# Patient Record
Sex: FEMALE | Race: WHITE | Employment: OTHER | URBAN - NONMETROPOLITAN AREA
[De-identification: names, ages, dates, MRNs, and addresses within clinical notes are randomized per-mention and may not be internally consistent; named-entity substitution may affect disease eponyms.]

---

## 2017-06-15 ENCOUNTER — HOSPITAL ENCOUNTER (OUTPATIENT)
Dept: WOMENS IMAGING | Age: 72
Discharge: HOME | End: 2017-06-15
Payer: MEDICARE

## 2017-06-15 DIAGNOSIS — Z12.31 VISIT FOR SCREENING MAMMOGRAM: ICD-10-CM

## 2017-06-15 PROCEDURE — G0202 SCR MAMMO BI INCL CAD: HCPCS

## 2018-03-23 ENCOUNTER — HOSPITAL ENCOUNTER (OUTPATIENT)
Age: 73
Discharge: HOME | End: 2018-03-23
Payer: MEDICARE

## 2018-03-23 LAB
-: ABNORMAL
AMORPHOUS: ABNORMAL
BACTERIA: ABNORMAL
BILIRUBIN URINE: ABNORMAL
CASTS UA: ABNORMAL /LPF
COLOR: YELLOW
COMMENT UA: ABNORMAL
CRYSTALS, UA: ABNORMAL /HPF
EPITHELIAL CELLS UA: ABNORMAL /HPF (ref 0–25)
GLUCOSE URINE: NEGATIVE
KETONES, URINE: NEGATIVE
LEUKOCYTE ESTERASE, URINE: NEGATIVE
MUCUS: ABNORMAL
NITRITE, URINE: NEGATIVE
OTHER OBSERVATIONS UA: ABNORMAL
PH UA: 5 (ref 5–9)
PROTEIN UA: NEGATIVE
RBC UA: ABNORMAL /HPF (ref 0–2)
RENAL EPITHELIAL, UA: ABNORMAL /HPF
SPECIFIC GRAVITY UA: >1.03 (ref 1.01–1.02)
TRICHOMONAS: ABNORMAL
TURBIDITY: CLEAR
URINE HGB: NEGATIVE
UROBILINOGEN, URINE: NORMAL
WBC UA: ABNORMAL /HPF (ref 0–5)
YEAST: ABNORMAL

## 2018-03-23 PROCEDURE — 81001 URINALYSIS AUTO W/SCOPE: CPT

## 2018-03-23 PROCEDURE — 87086 URINE CULTURE/COLONY COUNT: CPT

## 2018-03-24 LAB
CULTURE: NORMAL
CULTURE: NORMAL
Lab: NORMAL
Lab: NORMAL
SPECIMEN DESCRIPTION: NORMAL
SPECIMEN DESCRIPTION: NORMAL
STATUS: NORMAL

## 2019-11-21 PROBLEM — R73.01 IMPAIRED FASTING GLUCOSE: Status: ACTIVE | Noted: 2019-11-21

## 2019-11-21 PROBLEM — R73.01 IMPAIRED FASTING GLUCOSE: Chronic | Status: ACTIVE | Noted: 2019-11-21

## 2022-09-29 ENCOUNTER — HOSPITAL ENCOUNTER (EMERGENCY)
Age: 77
Discharge: HOME | End: 2022-09-30
Attending: STUDENT IN AN ORGANIZED HEALTH CARE EDUCATION/TRAINING PROGRAM
Payer: MEDICARE

## 2022-09-29 ENCOUNTER — APPOINTMENT (OUTPATIENT)
Dept: CT IMAGING | Age: 77
End: 2022-09-29
Payer: MEDICARE

## 2022-09-29 VITALS
HEART RATE: 70 BPM | SYSTOLIC BLOOD PRESSURE: 198 MMHG | BODY MASS INDEX: 29.88 KG/M2 | HEIGHT: 64 IN | RESPIRATION RATE: 20 BRPM | OXYGEN SATURATION: 99 % | DIASTOLIC BLOOD PRESSURE: 76 MMHG | WEIGHT: 175 LBS

## 2022-09-29 DIAGNOSIS — R93.5 ABNORMAL COMPUTED TOMOGRAPHY OF ABDOMEN AND PELVIS: Primary | ICD-10-CM

## 2022-09-29 DIAGNOSIS — R31.9 HEMATURIA, UNSPECIFIED TYPE: ICD-10-CM

## 2022-09-29 LAB
ABSOLUTE EOS #: 0.14 K/UL (ref 0–0.44)
ABSOLUTE IMMATURE GRANULOCYTE: 0.03 K/UL (ref 0–0.3)
ABSOLUTE LYMPH #: 2.18 K/UL (ref 1.1–3.7)
ABSOLUTE MONO #: 1 K/UL (ref 0.1–1.2)
ANION GAP SERPL CALCULATED.3IONS-SCNC: 13 MMOL/L (ref 9–17)
BACTERIA: ABNORMAL
BASOPHILS # BLD: 1 % (ref 0–2)
BASOPHILS ABSOLUTE: 0.06 K/UL (ref 0–0.2)
BILIRUBIN URINE: NEGATIVE
BUN BLDV-MCNC: 19 MG/DL (ref 8–23)
BUN/CREAT BLD: 23 (ref 9–20)
CALCIUM SERPL-MCNC: 10 MG/DL (ref 8.6–10.4)
CHLORIDE BLD-SCNC: 102 MMOL/L (ref 98–107)
CO2: 23 MMOL/L (ref 20–31)
COLOR: YELLOW
CREAT SERPL-MCNC: 0.82 MG/DL (ref 0.5–0.9)
EOSINOPHILS RELATIVE PERCENT: 1 % (ref 1–4)
EPITHELIAL CELLS UA: ABNORMAL /HPF (ref 0–25)
GFR AFRICAN AMERICAN: >60 ML/MIN
GFR NON-AFRICAN AMERICAN: >60 ML/MIN
GFR SERPL CREATININE-BSD FRML MDRD: ABNORMAL ML/MIN/{1.73_M2}
GFR SERPL CREATININE-BSD FRML MDRD: ABNORMAL ML/MIN/{1.73_M2}
GLUCOSE BLD-MCNC: 108 MG/DL (ref 70–99)
GLUCOSE URINE: NEGATIVE
HCT VFR BLD CALC: 43.6 % (ref 36.3–47.1)
HEMOGLOBIN: 15.1 G/DL (ref 11.9–15.1)
IMMATURE GRANULOCYTES: 0 %
KETONES, URINE: NEGATIVE
LEUKOCYTE ESTERASE, URINE: NEGATIVE
LYMPHOCYTES # BLD: 20 % (ref 24–43)
MAGNESIUM: 2 MG/DL (ref 1.6–2.6)
MCH RBC QN AUTO: 31.3 PG (ref 25.2–33.5)
MCHC RBC AUTO-ENTMCNC: 34.6 G/DL (ref 28.4–34.8)
MCV RBC AUTO: 90.3 FL (ref 82.6–102.9)
MONOCYTES # BLD: 9 % (ref 3–12)
NITRITE, URINE: NEGATIVE
NRBC AUTOMATED: 0 PER 100 WBC
PDW BLD-RTO: 12.5 % (ref 11.8–14.4)
PH UA: 6 (ref 5–9)
PLATELET # BLD: 201 K/UL (ref 138–453)
PMV BLD AUTO: 10.7 FL (ref 8.1–13.5)
POTASSIUM SERPL-SCNC: 3.8 MMOL/L (ref 3.7–5.3)
PROTEIN UA: ABNORMAL
RBC # BLD: 4.83 M/UL (ref 3.95–5.11)
RBC UA: ABNORMAL /HPF (ref 0–2)
SEG NEUTROPHILS: 69 % (ref 36–65)
SEGMENTED NEUTROPHILS ABSOLUTE COUNT: 7.49 K/UL (ref 1.5–8.1)
SODIUM BLD-SCNC: 138 MMOL/L (ref 135–144)
SPECIFIC GRAVITY UA: >1.03 (ref 1.01–1.02)
TURBIDITY: CLEAR
URINE HGB: ABNORMAL
UROBILINOGEN, URINE: NORMAL
WBC # BLD: 10.9 K/UL (ref 3.5–11.3)
WBC UA: ABNORMAL /HPF (ref 0–5)

## 2022-09-29 PROCEDURE — 87086 URINE CULTURE/COLONY COUNT: CPT

## 2022-09-29 PROCEDURE — 74176 CT ABD & PELVIS W/O CONTRAST: CPT

## 2022-09-29 PROCEDURE — 99284 EMERGENCY DEPT VISIT MOD MDM: CPT

## 2022-09-29 PROCEDURE — 85025 COMPLETE CBC W/AUTO DIFF WBC: CPT

## 2022-09-29 PROCEDURE — 83735 ASSAY OF MAGNESIUM: CPT

## 2022-09-29 PROCEDURE — 80048 BASIC METABOLIC PNL TOTAL CA: CPT

## 2022-09-29 PROCEDURE — 81001 URINALYSIS AUTO W/SCOPE: CPT

## 2022-09-29 PROCEDURE — 36415 COLL VENOUS BLD VENIPUNCTURE: CPT

## 2022-09-29 ASSESSMENT — ENCOUNTER SYMPTOMS
ABDOMINAL PAIN: 0
NAUSEA: 0
VOMITING: 0
SHORTNESS OF BREATH: 0
RHINORRHEA: 0
EYE PAIN: 0

## 2022-09-29 ASSESSMENT — PAIN - FUNCTIONAL ASSESSMENT: PAIN_FUNCTIONAL_ASSESSMENT: NONE - DENIES PAIN

## 2022-09-30 NOTE — ED PROVIDER NOTES
[facility] ED  EMERGENCY DEPARTMENT ENCOUNTER      Pt Name: Maikol Hubbard  MRN: [id number]  [place] 1945  Date of evaluation: 9/29/2022  Provider: Ethel Man MD     CHIEF COMPLAINT       Chief Complaint   Patient presents with    Hematuria     Patient presents to the emergency department with complaint of possible UTI or Vaginal Bleeding. Patient reports that this evening around 2000 she noticed blood that was dark red on her mai pad. She is unsure of where the blood came from. Upon arrival to emergency department denies any type of bleeding at this time \"My pad is all clear\"  Denies urinary symptoms cramping or pain          HISTORY OF PRESENT ILLNESS   (Location/Symptom, Timing/Onset, Context/Setting, Quality, Duration, Modifying Factors, Severity) Note limiting factors. I wore a surgical mask for the entirety of this encounter. HPI    Maikol Hubbard is a 65 y.o. female with a past medical history significant for GERD, hyperlipidemia, and hypertension who presents to the emergency department for evaluation for vaginal bleeding. Patient states that she wears a pad for incontinence. She states when she woke up this morning there was some dark blood on there. She says she has noticed it 2 more times during the day. She states as a result she called and asked  And was instructed to come to the emergency department for evaluation. Patient denies fevers, chills, abdominal pain, nausea or vomiting. She states she has chronic back pain and this has not changed. She denies any urinary frequency or dysuria. Nursing Notes were reviewed. REVIEW OF SYSTEMS    (2+ for level 4; 10+ for level 5)   Review of Systems   Constitutional:  Negative for activity change, diaphoresis and unexpected weight change. HENT:  Negative for congestion and rhinorrhea. Eyes:  Negative for pain and visual disturbance. Respiratory:  Negative for shortness of breath.     Cardiovascular:  Negative for chest pain and palpitations. Gastrointestinal:  Negative for abdominal pain, nausea and vomiting. Genitourinary:  Positive for vaginal bleeding. Negative for decreased urine volume and hematuria. Musculoskeletal:  Negative for arthralgias and myalgias. Skin:  Negative for wound. Neurological:  Negative for weakness and light-headedness.      PAST MEDICAL HISTORY     Past Medical History:   Diagnosis Date    Arthritis     GERD (gastroesophageal reflux disease)     Hyperlipidemia     Hypertension     NO MEDS    Impaired fasting glucose        SURGICAL HISTORY       Past Surgical History:   Procedure Laterality Date    TOTAL HIP ARTHROPLASTY Left 2016    Dr Major Patella       Previous Medications    ASPIRIN 325 MG TABLET    Take 325 mg by mouth daily    ATORVASTATIN (LIPITOR) 20 MG TABLET    TAKE 1 TABLET BY MOUTH ONCE A DAY    MULTIPLE VITAMINS-MINERALS (HAIR SKIN AND NAILS FORMULA PO)    Take by mouth daily Centrum silver       ALLERGIES     Seasonal    FAMILY HISTORY       Family History   Problem Relation Age of Onset    High Blood Pressure Mother     Prostate Cancer Brother     Alcohol Abuse Father         SOCIAL HISTORY       Social History     Socioeconomic History    Marital status:    Tobacco Use    Smoking status: Former     Packs/day: 1.00     Years: 2.00     Pack years: 2.00     Types: Cigarettes     Quit date:      Years since quittin.7    Smokeless tobacco: Never   Substance and Sexual Activity    Alcohol use: Not Currently     Comment: former alcoholic - treatment center , 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  sober since then    Drug use: No    Sexual activity: Yes     Social Determinants of Health     Financial Resource Strain: Low Risk     Difficulty of Paying Living Expenses: Not hard at all   Food Insecurity: No Food Insecurity    Worried About Running Out of Food in the Last Year: Never true    Ran Out of Food in the Last Year: Never true       SCREENINGS    [place] Coma Scale  Eye Opening: Spontaneous  Best Verbal Response: Oriented  Best Motor Response: Obeys commands  [place] Coma Scale Score: 15      PHYSICAL EXAM    (up to 7 for level 4, 8 or more for level 5)     ED Triage Vitals [09/29/22 2212]   BP Temp Temp src Heart Rate Resp SpO2 Height Weight   (!) 198/76 -- -- 70 20 99 % 5' 4\" (1.626 m) 175 lb (79.4 kg)       Physical Exam  Vitals and nursing note reviewed. Exam conducted with a chaperone present. Constitutional:       General: She is not in acute distress. Appearance: She is not ill-appearing or toxic-appearing. HENT:      Head: Normocephalic and atraumatic. Right Ear: External ear normal.      Left Ear: External ear normal.      Nose: No congestion or rhinorrhea. Mouth/Throat:      Mouth: Mucous membranes are moist.      Pharynx: No oropharyngeal exudate. Eyes:      General: No scleral icterus. Right eye: No discharge. Left eye: No discharge. Conjunctiva/sclera: Conjunctivae normal.   Cardiovascular:      Rate and Rhythm: Normal rate. Pulses: Normal pulses. Heart sounds: No murmur heard. No gallop. Pulmonary:      Effort: Pulmonary effort is normal. No respiratory distress. Breath sounds: Normal breath sounds. No stridor. No wheezing or rhonchi. Abdominal:      General: Abdomen is flat. There is no distension. Palpations: Abdomen is soft. Tenderness: There is no abdominal tenderness. There is left CVA tenderness. There is no right CVA tenderness. Genitourinary:     Exam position: Supine. Labia:         Right: No rash, tenderness, lesion or injury. Left: No rash, tenderness, lesion or injury. Urethra: No prolapse, urethral pain, urethral swelling or urethral lesion.       Vagina: Normal.      Cervix: Normal.      Uterus: Normal.       Adnexa: Right adnexa normal and left adnexa normal.   Musculoskeletal:         General: No swelling, tenderness or deformity. Cervical back: Normal range of motion and neck supple. Skin:     General: Skin is warm. Coloration: Skin is not jaundiced. Findings: No bruising or rash. Neurological:      General: No focal deficit present. Mental Status: She is alert and oriented to person, place, and time. Psychiatric:         Mood and Affect: Mood normal.         Behavior: Behavior normal.         Thought Content: Thought content normal.       DIAGNOSTIC RESULTS   Interpretation per the Radiologist below, if available at the time of this note:  CT ABDOMEN PELVIS WO CONTRAST Additional Contrast? None    Result Date: 9/29/2022  EXAMINATION: CT OF THE ABDOMEN AND PELVIS WITHOUT CONTRAST 9/29/2022 10:55 pm TECHNIQUE: CT of the abdomen and pelvis was performed without the administration of intravenous contrast. Multiplanar reformatted images are provided for review. Automated exposure control, iterative reconstruction, and/or weight based adjustment of the mA/kV was utilized to reduce the radiation dose to as low as reasonably achievable. COMPARISON: None. HISTORY: ORDERING SYSTEM PROVIDED HISTORY: hematurua withleft cva tenderness TECHNOLOGIST PROVIDED HISTORY: hematurua withleft cva tenderness Decision Support Exception - unselect if not a suspected or confirmed emergency medical condition->Emergency Medical Condition (MA) FINDINGS: LOWER CHEST: Calcified granuloma within the left lower lobe. Visualized portion of the lower chest demonstrates no other1 abnormality. KIDNEYS AND URINARY TRACT: No renal calculi are identified. There is no evidence for hydronephrosis. The ureters are of normal course and caliber. ORGANS: Visualized portions of the liver, spleen, pancreas, gallbladder, and adrenal glands demonstrate no acute abnormality. GI/BOWEL: No bowel obstruction. No evidence of acute appendicitis. Diverticulosis with no evidence of diverticulitis.  PELVIS: Fluid distension of the endometrial cavity and 19 mm polypoid lesion within the endometrial cavity of the uterus. The bladder and pelvic organs are unremarkable. PERITONEUM/RETROPERITONEUM: No free air or free fluid is noted. No pathologically enlarged lymphadenopathy. The vasculature do not demonstrate acute abnormality. BONES/SOFT TISSUES: The osseous structures demonstrate no acute abnormality. Changes related to left total hip arthroplasty. Fat containing umbilical hernia. At L4-L5, grade 1 anterolisthesis. At L5-S1, grade 1 anterolisthesis, bilateral pars defects and severe bilateral neural foraminal narrowing. No obstructive uropathy. No renal stone. Fluid distension of the endometrial cavity and 19 mm polypoid lesion within the endometrial cavity of the uterus. Considering patient age for further evaluation pelvic ultrasound examination is recommended. Diverticulosis with no evidence of diverticulitis. Fat containing umbilical hernia. ED BEDSIDE ULTRASOUND:   Performed by ED Physician - none    LABS:  Labs Reviewed   URINALYSIS WITH MICROSCOPIC - Abnormal; Notable for the following components:       Result Value    Specific Gravity, UA >1.030 (*)     Urine Hgb 3+ (*)     Protein, UA TRACE (*)     Bacteria, UA 1+ (*)     All other components within normal limits   CBC WITH AUTO DIFFERENTIAL - Abnormal; Notable for the following components:    Seg Neutrophils 69 (*)     Lymphocytes 20 (*)     All other components within normal limits   BASIC METABOLIC PANEL - Abnormal; Notable for the following components:    Glucose 108 (*)     Bun/Cre Ratio 23 (*)     All other components within normal limits   CULTURE, URINE   MAGNESIUM        All other labs were within normal range or not returned as of this dictation.     EMERGENCY DEPARTMENT COURSE and DIFFERENTIAL DIAGNOSIS/MDM:   Vitals:    Vitals:    09/29/22 2212   BP: (!) 198/76   Pulse: 70   Resp: 20   SpO2: 99%   Weight: 175 lb (79.4 kg)   Height: 5' 4\" (1.626 m) Medications - No data to display    MDM  Presenting for evaluation for vaginal bleeding noted to have CVA tenderness on the left. Presentation is concerning for kidney stone versus vaginal abdominal bleeding versus UTI. Work-up in the department with urinalysis with hematuria with 1+ bacteria but no nitrates or leukocytes, no electrolyte abnormalities, no renal function impairment, no transaminitis, no leukocytosis, CT of the abdomen pelvis concerning for an intra uterine lesion. Discussed lab and imaging results with the patient. Discussed with the patient will need follow-up with gynecology for pelvic ultrasound. Patient verbalized understanding of information given and agreed to plan. Was given information on how to contact Dr. Tj Alarcon office for an appointment. Patient was discharged in stable condition with return precautions. REVAL:   Patient remained hemodynamically stable in the emergency department with no vaginal bleeding while in the department. CRITICAL CARE TIME   Total Critical Care time was 20 minutes, excluding separately reportable procedures. There was a high probability of clinically significant/life threatening deterioration in the patient's condition which required my urgent intervention. CONSULTS:  None    PROCEDURES:  Unless otherwise noted below, none     Procedures    FINAL IMPRESSION      1. Abnormal computed tomography of abdomen and pelvis    2.  Hematuria, unspecified type          DISPOSITION/PLAN   DISPOSITION Decision To Discharge 09/30/2022 12:13:15 AM      PATIENT REFERRED TO:  Maribel Villagran MD  [address] [facility] [address]  [phone]    Schedule an appointment as soon as possible for a visit on 10/3/2022  For follow-up    Claudia Cabral MD  [facility] [address] [place] [address]  [phone]    Schedule an appointment as soon as possible for a visit on 10/3/2022  For a pelvic us for follow-up for the CT scan findings. DISCHARGE MEDICATIONS:  New Prescriptions    No medications on file          (Please note:  Portions of this note were completed with a voice recognition program.  Efforts were made to edit the dictations but occasionally words and phrases are mis-transcribed.)  Form v2016. J.[id number]    Clearance MD Mustapha (electronically signed)  Emergency Medicine Provider       Clearance MD Mustapha  09/30/22 5999

## 2022-09-30 NOTE — DISCHARGE INSTRUCTIONS
Thank you for trusting us  with your care today. You may use tylenol or ibuprofen as needed for fever and pain. Please also make sure to follow-up with Dr Stefan Andujar for further evaluation of your uterus with an ultrasound. Please make an appointment with your primary care doctor for reevalaution in 1-4 days. Please return to the emergency department for any new concerning or worsening symptoms.

## 2022-10-01 LAB
CULTURE: NORMAL
SPECIMEN DESCRIPTION: NORMAL

## 2022-10-04 ENCOUNTER — HOSPITAL ENCOUNTER (OUTPATIENT)
Dept: ULTRASOUND IMAGING | Age: 77
Discharge: HOME | End: 2022-10-06
Payer: MEDICARE

## 2022-10-04 DIAGNOSIS — N84.0 ABNORMAL UTERINE BLEEDING DUE TO ENDOMETRIAL POLYP: ICD-10-CM

## 2022-10-04 DIAGNOSIS — N93.9 ABNORMAL UTERINE BLEEDING DUE TO ENDOMETRIAL POLYP: ICD-10-CM

## 2022-10-04 PROCEDURE — 76830 TRANSVAGINAL US NON-OB: CPT

## 2022-10-04 PROCEDURE — 76856 US EXAM PELVIC COMPLETE: CPT

## 2022-11-01 ENCOUNTER — HOSPITAL ENCOUNTER (OUTPATIENT)
Age: 77
Discharge: HOME | End: 2022-11-01
Payer: MEDICARE

## 2022-11-01 DIAGNOSIS — R31.9 HEMATURIA, UNSPECIFIED TYPE: ICD-10-CM

## 2022-11-01 LAB
BILIRUBIN URINE: NEGATIVE
COLOR: YELLOW
EPITHELIAL CELLS UA: NORMAL /HPF (ref 0–25)
GLUCOSE URINE: NEGATIVE
KETONES, URINE: NEGATIVE
LEUKOCYTE ESTERASE, URINE: ABNORMAL
NITRITE, URINE: NEGATIVE
PH UA: 7 (ref 5–9)
PROTEIN UA: NEGATIVE
RBC UA: NORMAL /HPF (ref 0–2)
SPECIFIC GRAVITY UA: 1.01 (ref 1.01–1.02)
TURBIDITY: ABNORMAL
URINE HGB: ABNORMAL
UROBILINOGEN, URINE: NORMAL
WBC UA: NORMAL /HPF (ref 0–5)

## 2022-11-01 PROCEDURE — 81001 URINALYSIS AUTO W/SCOPE: CPT

## 2022-11-14 ENCOUNTER — PROCEDURE VISIT (OUTPATIENT)
Dept: OBGYN | Age: 77
End: 2022-11-14
Payer: MEDICARE

## 2022-11-14 ENCOUNTER — HOSPITAL ENCOUNTER (OUTPATIENT)
Age: 77
Setting detail: SPECIMEN
Discharge: HOME | End: 2022-11-14
Payer: MEDICARE

## 2022-11-14 VITALS
SYSTOLIC BLOOD PRESSURE: 140 MMHG | DIASTOLIC BLOOD PRESSURE: 86 MMHG | BODY MASS INDEX: 29.53 KG/M2 | WEIGHT: 173 LBS | HEIGHT: 64 IN

## 2022-11-14 DIAGNOSIS — R93.89 ENDOMETRIAL THICKENING ON ULTRASOUND: ICD-10-CM

## 2022-11-14 DIAGNOSIS — Z01.419 WOMEN'S ANNUAL ROUTINE GYNECOLOGICAL EXAMINATION: ICD-10-CM

## 2022-11-14 DIAGNOSIS — N95.0 PMB (POSTMENOPAUSAL BLEEDING): ICD-10-CM

## 2022-11-14 DIAGNOSIS — Z01.419 WOMEN'S ANNUAL ROUTINE GYNECOLOGICAL EXAMINATION: Primary | ICD-10-CM

## 2022-11-14 PROCEDURE — 88305 TISSUE EXAM BY PATHOLOGIST: CPT

## 2022-11-14 PROCEDURE — 58100 BIOPSY OF UTERUS LINING: CPT | Performed by: OBSTETRICS & GYNECOLOGY

## 2022-11-14 PROCEDURE — 88342 IMHCHEM/IMCYTCHM 1ST ANTB: CPT

## 2022-11-14 PROCEDURE — G0123 SCREEN CERV/VAG THIN LAYER: HCPCS

## 2022-11-14 PROCEDURE — 88341 IMHCHEM/IMCYTCHM EA ADD ANTB: CPT

## 2022-11-14 PROCEDURE — G0145 SCR C/V CYTO,THINLAYER,RESCR: HCPCS

## 2022-11-14 NOTE — PROGRESS NOTES
PROBLEM VISIT     Date of service: 2022    Camila Sanchez  Is a 65 y.o.  female    PT's PCP is: Jayne Eng MD     : 1945                                             Subjective:       No LMP recorded. Patient is postmenopausal.     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  sober since then       Social History     Substance and Sexual Activity   Sexual Activity Not Currently       Allergies: Seasonal    Chief Complaint   Patient presents with    Consultation     Pt was referred by Carrillo Early for Endometrial biopsy due to PMB. Last Yearly:  unknown    Last pap: unknown    Last HPV: never      NURSE: LMD    PE:  Vital Signs  Blood pressure (!) 140/86, height 5' 4\" (1.626 m), weight 173 lb (78.5 kg), not currently breastfeeding. Labs:    No results found for this visit on 22.         HPI: The patient is here as a referral for postmenopausal bleeding and endometrial thickening on ultrasound exam.    Yes PT denies fever, chills, nausea and vomiting       Objective     Pelvic Exam: GENITAL/URINARY:  External Genitalia:  General appearance; normal, Hair distribution; normal, Lesions absent  Vagina:  General appearance normal, Estrogen effect normal, Discharge absent, Lesions absent, Pelvic support normal, small amount of dark blood in the vault  Cervix:  General appearance normal, Lesions absent, Discharge absent, Tenderness absent, Enlargement absent, Nodularity absent  Uterus:  Size normal, Contour normal, Position normal, Masses absent, Consistency; normal, Support normal, Tenderness absent  Adenexa: Masses absent, Tenderness absent, Enlargement absent, Nodularity absent                                    Vaginal discharge: no vaginal discharge                          Assessment and Plan: With these findings I did proceed to do a Pap smear then endometrial biopsy. After the Pap was completed the cervix was thoroughly painted with Betadine. It was necessary to place a tenaculum on the anterior lip of the cervix to use the os finder and dilator and then place the Pipelle. There was to a depth of 8 cm. Tissue and dark blood were noted within the Pipelle. The patient tolerated the procedure well. Will have patient return to the office after results are completed. Diagnosis Orders   1. Women's annual routine gynecological examination  PAP SMEAR      2. PMB (postmenopausal bleeding)  ME BIOPSY OF UTERUS LINING    Surgical Pathology      3. Endometrial thickening on ultrasound  ME BIOPSY OF UTERUS LINING    Surgical Pathology                I am having Zondra Brim maintain her Multiple Vitamins-Minerals (HAIR SKIN AND NAILS FORMULA PO), aspirin, atorvastatin, and escitalopram.    No follow-ups on file. There are no Patient Instructions on file for this visit. Over 50% of time spent on counseling and care coordination on: see assessment and plan,  She was also counseled on her preventative health maintenance recommendations and follow-up.         FF time: 15 min      Dhaval Mccarthy MD,11/14/2022 1:56 PM

## 2022-11-16 ENCOUNTER — OFFICE VISIT (OUTPATIENT)
Dept: OBGYN | Age: 77
End: 2022-11-16
Payer: MEDICARE

## 2022-11-16 VITALS
SYSTOLIC BLOOD PRESSURE: 140 MMHG | HEIGHT: 64 IN | BODY MASS INDEX: 29.53 KG/M2 | WEIGHT: 173 LBS | DIASTOLIC BLOOD PRESSURE: 86 MMHG

## 2022-11-16 DIAGNOSIS — F41.8 ANXIETY ABOUT HEALTH: ICD-10-CM

## 2022-11-16 DIAGNOSIS — C54.1 ENDOMETRIAL ADENOCARCINOMA (HCC): Primary | ICD-10-CM

## 2022-11-16 LAB — SURGICAL PATHOLOGY REPORT: NORMAL

## 2022-11-16 PROCEDURE — 3074F SYST BP LT 130 MM HG: CPT | Performed by: OBSTETRICS & GYNECOLOGY

## 2022-11-16 PROCEDURE — G8400 PT W/DXA NO RESULTS DOC: HCPCS | Performed by: OBSTETRICS & GYNECOLOGY

## 2022-11-16 PROCEDURE — 1036F TOBACCO NON-USER: CPT | Performed by: OBSTETRICS & GYNECOLOGY

## 2022-11-16 PROCEDURE — 1090F PRES/ABSN URINE INCON ASSESS: CPT | Performed by: OBSTETRICS & GYNECOLOGY

## 2022-11-16 PROCEDURE — 99213 OFFICE O/P EST LOW 20 MIN: CPT | Performed by: OBSTETRICS & GYNECOLOGY

## 2022-11-16 PROCEDURE — G8427 DOCREV CUR MEDS BY ELIG CLIN: HCPCS | Performed by: OBSTETRICS & GYNECOLOGY

## 2022-11-16 PROCEDURE — 1123F ACP DISCUSS/DSCN MKR DOCD: CPT | Performed by: OBSTETRICS & GYNECOLOGY

## 2022-11-16 PROCEDURE — G8484 FLU IMMUNIZE NO ADMIN: HCPCS | Performed by: OBSTETRICS & GYNECOLOGY

## 2022-11-16 PROCEDURE — 3078F DIAST BP <80 MM HG: CPT | Performed by: OBSTETRICS & GYNECOLOGY

## 2022-11-16 PROCEDURE — G8417 CALC BMI ABV UP PARAM F/U: HCPCS | Performed by: OBSTETRICS & GYNECOLOGY

## 2022-11-16 RX ORDER — ALPRAZOLAM 1 MG/1
TABLET ORAL
Qty: 90 TABLET | Refills: 1 | Status: SHIPPED | OUTPATIENT
Start: 2022-11-16 | End: 2023-01-30

## 2022-11-16 NOTE — PROGRESS NOTES
PROBLEM VISIT     Date of service: 2022    Myke Carmichael  Is a 65 y.o.  female    PT's PCP is: Adamaris Hawkins MD     : 1945                                             Subjective:       No LMP recorded. Patient is postmenopausal.     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  sober since then       Social History     Substance and Sexual Activity   Sexual Activity Not Currently       Allergies: Seasonal    Chief Complaint   Patient presents with    Consultation     Pt is here today to review endometrial biopsy pathology results. Last Yearly:  22    Last pap: 22    Last HPV: unknown      NURSE: LMD    PE:  Vital Signs  Blood pressure (!) 140/86, height 5' 4\" (1.626 m), weight 173 lb (78.5 kg), not currently breastfeeding. Labs:    No results found for this visit on 22. NURSE: Enmanuel Kaye    HPI: The patient is here today to discuss recent findings on endometrial biopsy. Yes  PT denies fever, chills, nausea and vomiting       Objective: I did review with the patient the findings of grade 1 endometrial adenocarcinoma noted on recent endometrial biopsy. Assessment and Plan: I did have a discussion with the patient and after discussion will send her to the [facility] for further surgical evaluation and treatment. The patient states this diagnosis is making her anxious so I will have some some Xanax available to her to help her with this anxiety regarding this recent diagnosis. Diagnosis Orders   1. Endometrial adenocarcinoma ([address]        2. Anxiety about health  ALPRAZolam (XANAX) 1 MG tablet                No follow-ups on file. FF: 15 minutes    There are no Patient Instructions on file for this visit. Over 75%of time spent on counseling and care coordination on: see assessment and plan,  She was also counseled on her preventative health maintenance recommendations and follow-up.       Pedro Alfonso MD,11/16/2022 3:21 PM

## 2022-11-17 ENCOUNTER — HOSPITAL ENCOUNTER (OUTPATIENT)
Age: 77
Discharge: HOME | End: 2022-11-17
Payer: MEDICARE

## 2022-11-17 DIAGNOSIS — C54.1 ENDOMETRIAL ADENOCARCINOMA (HCC): ICD-10-CM

## 2022-11-17 PROCEDURE — 86304 IMMUNOASSAY TUMOR CA 125: CPT

## 2022-11-17 PROCEDURE — 36415 COLL VENOUS BLD VENIPUNCTURE: CPT

## 2022-11-18 LAB — CA 125: 11 U/ML

## 2022-11-25 LAB — CYTOLOGY REPORT: NORMAL

## 2022-11-28 DIAGNOSIS — C54.1 ENDOMETRIAL ADENOCARCINOMA (HCC): Primary | ICD-10-CM

## 2022-12-01 ENCOUNTER — TELEPHONE (OUTPATIENT)
Dept: OBGYN | Age: 77
End: 2022-12-01

## 2022-12-01 DIAGNOSIS — C54.1 ENDOMETRIAL ADENOCARCINOMA (HCC): Primary | ICD-10-CM

## 2022-12-01 NOTE — TELEPHONE ENCOUNTER
Pt informed and referral placed to Latrice Vargas at [facility]

## 2022-12-01 NOTE — TELEPHONE ENCOUNTER
Pt called and was informed by [facility] that she is out of network and the [facility] finance team is unable to help her.  Mary Clayton would like to know if there is another Dr you could refer her to?

## 2022-12-01 NOTE — TELEPHONE ENCOUNTER
Pt called in with questions in regards to her referral to the [facility] center, she had spoke to someone there and was just a little concerned about her insurance coverage and had additional questions, I personally called the [facility] and was informed that their financial clearance team is working on this, they contact the insurance and go over everything they will contact her in 2-3 days normally, pt was informed of this and voices understanding.

## 2022-12-02 ENCOUNTER — HOSPITAL ENCOUNTER (OUTPATIENT)
Age: 77
Setting detail: SPECIMEN
Discharge: HOME | End: 2022-12-02
Payer: MEDICARE

## 2022-12-02 DIAGNOSIS — R31.9 HEMATURIA, UNSPECIFIED TYPE: ICD-10-CM

## 2022-12-02 LAB
BACTERIA: ABNORMAL
BILIRUBIN URINE: ABNORMAL
COLOR: YELLOW
EPITHELIAL CELLS UA: ABNORMAL /HPF (ref 0–25)
GLUCOSE URINE: NEGATIVE
KETONES, URINE: ABNORMAL
LEUKOCYTE ESTERASE, URINE: ABNORMAL
MUCUS: ABNORMAL
NITRITE, URINE: NEGATIVE
PH UA: 6 (ref 5–9)
PROTEIN UA: ABNORMAL
RBC UA: ABNORMAL /HPF (ref 0–2)
SPECIFIC GRAVITY UA: >1.03 (ref 1.01–1.02)
TURBIDITY: ABNORMAL
URINE HGB: ABNORMAL
UROBILINOGEN, URINE: NORMAL
WBC UA: ABNORMAL /HPF (ref 0–5)

## 2022-12-02 PROCEDURE — 81001 URINALYSIS AUTO W/SCOPE: CPT

## 2022-12-02 PROCEDURE — 87086 URINE CULTURE/COLONY COUNT: CPT

## 2022-12-03 LAB
CULTURE: NORMAL
SPECIMEN DESCRIPTION: NORMAL

## 2022-12-07 ENCOUNTER — OFFICE VISIT (OUTPATIENT)
Dept: GYNECOLOGIC ONCOLOGY | Age: 77
End: 2022-12-07
Payer: MEDICARE

## 2022-12-07 ENCOUNTER — PREP FOR PROCEDURE (OUTPATIENT)
Dept: GYNECOLOGIC ONCOLOGY | Age: 77
End: 2022-12-07

## 2022-12-07 VITALS
TEMPERATURE: 97.5 F | OXYGEN SATURATION: 98 % | HEART RATE: 72 BPM | WEIGHT: 170.8 LBS | DIASTOLIC BLOOD PRESSURE: 72 MMHG | HEIGHT: 64 IN | SYSTOLIC BLOOD PRESSURE: 138 MMHG | BODY MASS INDEX: 29.16 KG/M2

## 2022-12-07 DIAGNOSIS — C54.1 ENDOMETRIAL CANCER (HCC): Primary | ICD-10-CM

## 2022-12-07 DIAGNOSIS — Z01.818 PRE-OP EVALUATION: ICD-10-CM

## 2022-12-07 PROCEDURE — 3078F DIAST BP <80 MM HG: CPT | Performed by: OBSTETRICS & GYNECOLOGY

## 2022-12-07 PROCEDURE — 3074F SYST BP LT 130 MM HG: CPT | Performed by: OBSTETRICS & GYNECOLOGY

## 2022-12-07 PROCEDURE — 99205 OFFICE O/P NEW HI 60 MIN: CPT | Performed by: OBSTETRICS & GYNECOLOGY

## 2022-12-07 PROCEDURE — 1123F ACP DISCUSS/DSCN MKR DOCD: CPT | Performed by: OBSTETRICS & GYNECOLOGY

## 2022-12-07 RX ORDER — SODIUM CHLORIDE 9 MG/ML
INJECTION, SOLUTION INTRAVENOUS PRN
OUTPATIENT
Start: 2022-12-07

## 2022-12-07 RX ORDER — SODIUM CHLORIDE 0.9 % (FLUSH) 0.9 %
5-40 SYRINGE (ML) INJECTION EVERY 12 HOURS SCHEDULED
OUTPATIENT
Start: 2022-12-07

## 2022-12-07 RX ORDER — SODIUM CHLORIDE 9 MG/ML
INJECTION, SOLUTION INTRAVENOUS CONTINUOUS
OUTPATIENT
Start: 2022-12-07

## 2022-12-07 RX ORDER — SODIUM CHLORIDE 0.9 % (FLUSH) 0.9 %
5-40 SYRINGE (ML) INJECTION PRN
OUTPATIENT
Start: 2022-12-07

## 2022-12-07 ASSESSMENT — ENCOUNTER SYMPTOMS: BACK PAIN: 1

## 2022-12-07 NOTE — PROGRESS NOTES
[address] [facility], [facility], [address] [address]      I am seeing Erick Anne for New Patient at the request of Dr. Taj Jacobson. HPI  She is a 65 y.o. female who is being referred for Adenocarcinoma, endometrioid grade 1. She presented to the ED on 9/30/22 for vaginal bleeding. She had a CT A/P completed at that time time which revealed \" fluid distension in endometrium and polypoid lesion measuring 19 mm. \" She was ultimately discharged home and recommended to follow up with Gynecologist. She had a TVUS on 10/4/22 that read \"thickened  and heterogenous endometrium with fluid measuring 19.9 mm.\"    She followed up with Dr. Taj Jacobson Ob/Gyn on 11/14/22 and had an EMB/PAP completed. Pathology revealed \"Adenocarcinoma, endometrioid grade 1\" and PAP was negative.  was 11. Last Mammogram was 6/15/17, BI-RADs 1. She has a history of 3 spontaneous vaginal deliveries. Her medical history is significant for  hypertension and hyperlipidemia. Today, she feels well and has no complaints. She denies any vaginal bleeding. Patient denies vaginal  irritation, itching, hematuria, dysuria, chest pain, SOB, F/C, N/V/D, HA, RUQ pain, visual changes. Review of Systems  I have personally reviewed and agree with the review of systems done by my ancillary staff in the [facility] documentation.       Past Medical History:   Diagnosis Date    Arthritis     Endometrial adenocarcinoma ([address] 2022    GERD (gastroesophageal reflux disease)     Hyperlipidemia     Hypertension     NO MEDS    Impaired fasting glucose          Past Surgical History:   Procedure Laterality Date    ENDOMETRIAL BIOPSY  11/2022    Dr. Taj Jacobson - endometrial adenocarcinoma grade 1    TOTAL HIP ARTHROPLASTY Left 12/12/2016    Dr Ronan Francis         Family History   Problem Relation Age of Onset    High Blood Pressure Mother     Prostate Cancer Brother     Alcohol Abuse Father          Social History     Tobacco Use    Smoking status: Former     Packs/day: 1.00     Years: 2.00     Pack years: 2.00     Types: Cigarettes     Quit date:      Years since quittin.9    Smokeless tobacco: Never   Vaping Use    Vaping Use: Never used   Substance Use Topics    Alcohol use: Not Currently     Comment: former alcoholic - treatment center , 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  sober since then    Drug use: No         Allergies   Allergen Reactions    Seasonal          OBJECTIVE:  There were no vitals filed for this visit. General: Well appearing, alert and oriented x3, no acute distress    HEENT:  EOM intact, INESSA, no cervical or supraclavicular lymphadenopathy. No Thyromegaly. Heart: Auscultation of heart revels a regular rate with no murmur, gallops, or rubs. Lungs:  Clear bilaterally to auscultation to the bases. CVA: No tenderness. Abdomen: soft, non-tender, non-distended, no guarding or rigidity    Lower Extremities:  No lymphedema, no calf tenderness, no musculoskeletal deformities. Pelvic Exam:  Normal appearing external genitalia, perineum, urethra, and Bartholin glands. Vaginal atrophy noted. Uterus small and mobile. Bladder smooth without any masses. No adnexal masses appreciated. Assessment/Plan:  Adenocarcinoma, endometrioid grade 1  - US (10/4/22): thickened and heterogenous endometrium with fluid measuring 19.9 mm  - EMB Pathology (22): Adenocarcinoma, endometrioid grade 1  - PAP negative, Ca 125 11 (22)  - Discussed recommendation for surgical intervention. Patient is electing to proceed with surgical management with robotic assisted laparoscopic hysterectomy with bilateral salpingo-oophorectomy, sentinel lymph node biopsy, possible laparotomy, procedures as indicated. The patient was counseled on the risks, benefits and alternatives of the procedure including but not limited to:   Infection, bleeding, blood transfusion, damage to internal organs such as bowel, bladder, ureters, blood vessels and nerves, deep vein thrombosis, pulmonary embolism, ICU care, anesthesia risks and death. Questions were answered, she voices understanding and desires to proceed      Electronically signed by Samson Cha DO on 12/7/22 at 10:43 AM EST      Gyn Oncology Attending Note    Patient seen and thoroughly evaluated by me today. Chart, notes, records, labs, pathology, history reviewed by me in detail  She tells me that I have answered all questions to her satisfaction. She tells me that she understands her diagnosis and my proposed recommendations for treatment. I have recommended that she undergo total robotic hysterectomy bilateral salpingo-oophorectomy and pelvic sentinel lymph node biopsy. She tells me that she understands all of the serious risks and downstream consequences of the operation as I have explained today. We will plan her operation in the weeks ahead. I spent 70 minutes managing this case in the office today.     Graciela Agarwal MD

## 2022-12-07 NOTE — PATIENT INSTRUCTIONS
POST OPERATIVE INSTRUCTIONS for HYSTERECTOMY    Pain Control: You may feel some chest, shoulder, or abdominal discomfort for a few days. This discomfort is a result of the gas that was introduced into the abdomen during surgery. Your body will absorb this gas within 24 to 48 hours which will relieve these symptoms. In the meantime, it may be helpful to apply heat to your abdomen or to lie flat. It is important to take a stool softener such as Colace while taking narcotic pain medication such as Percocet or Vicodin. Please purchase a stool softener before your surgery. You may take \"over the counter\" paint relievers that do not contain aspirin such as acetaminophen (Tylenol) or Ibuprofen (Motrin or Advil). Do not exceed the daily recommended dose. Note:  If the Pain is not relieved by pain medication, becomes worse, or you have difficulty breathing, call our office. Incision Care: Inspect your incision(s) daily. A small amount of blood or clear drainage from the incisions is normal and not a cause for concern. Bruising around your incision sites is common and not a cause for concern. Your incisions may become itchy for a few days. This is part of the normal healing process. As your incisions heal, they will change in color and may become numb for several weeks. If you have small dressings or band-aids, they may be removed 24 hours. If you have steri-strips (small adhesive strips) in place, they will peel and fall off. If they do not fall off within 10 days, carefully peel them off. If you have stitches, they will dissolve on their own. If you have staples, they will be removed at the post-operative visit. Note:  If you notice any redness, heave drainage, or bleeding from your incisions, please call our office at [phone]. Nutrition:  You may resume the diet you had prior to surgery. Drink 6-8 glasses of water daily. Bowel Function:   For the first several days after surgery, the bowel is usually less active. You may not have a regular bowel movement right away, pending on pre-op and bowel pain or pain medication use. Narcotic pain medications (Percocet, Vicodin, Hydrocodone, or Oxycodone) will increase constipation. Regular bowel movement may be less frequent. If constipation should occur:  Drink more fluids  Continue to take a stool softener such as Colace until constipation resolves. Take a mild laxative such as Milk of Magnesia    Swelling:  Mild abdominal swelling can occur following surgery due to slowing of the bowels and gas used to distend the abdomen during surgery. Swelling of the hands and lower extremities is common due to fluids given during surgery. Swelling of the face can occur due to positioning during surgery. If you have selling of the calves that is persistent or associated with redness, call our office. Activity:  If is normal to feel tired for a few days after surgery. Listen to your body and do not overdo it. Walking is encouraged immediately after surgery, as tolerated. You should NOT be bedridden after surgery as continued movement will prevent prolonged recovery times due to \"deconditioning\". If you could climb stairs un-aided prior to surgery you may resume climbing stairs after discharge following your procedure. No strenuous activities such as heavy lifting (greater than 10 pounds or a gallon of milk), pushing or pulling for six weeks. You may slowly increase your cardiovascular exercise after 2 weeks. Abdominal exercise should be avoided for 6 weeks. Do Not drive while taking prescription pain medication or if your level of discomfort could inhibit your ability to operate a motor vehicle safely. You may shower the day after surgery. Pat incisions dry. Do not rub incisions with washcloth or towel. Keep your incisions as dry as possible. You may take a bath after six weeks.   You must also wait 6 weeks to go into a swimming pool, hot tub, or the ocean. Vaginal Bleeding:  Light vaginal bleeding, spotting, or brown discharge for up to 6 weeks is common. Note: If you have heavy, bright red vaginal bleeding, call our office. Bleeding that fills a pad in one hour is considered to be heavy bleeding. Sexual Stowell:  Avoid placing anything in the vagina for 8 weeks (ie. .Tampons, Douching, and Sexual intercourse). Vaginal closure disruption can occur if sexual intercourse is resumed before healing is complete and will require additional surgery, which can lead to shortening of the vagina and painful intercourse. Follow Up Appointment:  Your follow up appointment will be scheduled on the same day we schedule your surgery. If one is not, please call the office when you get home to schedule. Call the office if you Experience:   A fever higher than 100.4 Fahrenheit  Increasing pain not controlled by pain medications  Inability to eat or drink without vomiting  Shortness of breath  Inability to empty your bladder  Redness and tenderness at the incision site, or a large amount of drainage  Heavy, bright red vaginal bleeding or foul odor from discharge. You can expect to have a small amount of reddish-brown colored discharge up to 6 weeks. DO NOT BE ALARMED BY THIS. If you fell you need to be seen emergently, please go to the Emergency Department, if possible, where your surgery was performed so that our physicians may care for you. Any questions regarding your surgery or post-operative recovery should be directed to the staff at the [facility]) Gynecology Oncology at [phone], rather than your primary care physician.

## 2022-12-08 ENCOUNTER — HOSPITAL ENCOUNTER (OUTPATIENT)
Age: 77
Discharge: HOME | End: 2022-12-10
Payer: MEDICARE

## 2022-12-08 ENCOUNTER — TELEPHONE (OUTPATIENT)
Dept: GYNECOLOGIC ONCOLOGY | Age: 77
End: 2022-12-08

## 2022-12-08 ENCOUNTER — HOSPITAL ENCOUNTER (OUTPATIENT)
Dept: GENERAL RADIOLOGY | Age: 77
Discharge: HOME | End: 2022-12-10
Payer: MEDICARE

## 2022-12-08 DIAGNOSIS — C54.1 ENDOMETRIAL CANCER (HCC): ICD-10-CM

## 2022-12-08 DIAGNOSIS — Z01.818 PRE-OP EVALUATION: ICD-10-CM

## 2022-12-08 PROCEDURE — 71046 X-RAY EXAM CHEST 2 VIEWS: CPT

## 2022-12-08 NOTE — TELEPHONE ENCOUNTER
Surgery Scheduled on 12/14/22, at 10:00 AM, arrive by 8:00 AM at [facility].  PAT scheduled on day of surgery,  Post Op appointment scheduled on 01/20/2023, at 11:00 AM, with DMITRIY Wright, at [facility] with patient and she verbalized understanding and all her questions were answered.

## 2022-12-11 NOTE — H&P
GYN/Onc Pre-Op H&P  [facility]    Patient Name: Anabella Toscano     Patient : 1945  Room/Bed: [facility] OR [facility]/NONE  Admission Date/Time: 2022  7:44 AM  Primary Care Physician: Zoie Pillai MD  MRN: [id number]    Date: 2022  Time: 10:13 AM    The patient was seen in pre-op holding. She is here for Robotic assisted laparoscopic total hysterectomy, Bilateral salpingo-oophorectomy, pelvic sentinel lymph node biopsy, possible exploratory laparotomy, all other necessary procedures . Please refer to Progress Note dated 22 by Dr. Herminio Jeffers for further details. There have been no changes to health history or medications since this visit. The procedure risks and complications were reviewed. The labs, Consent, and H&P were reviewed and updated. The patient was counseled on the possibility of  the need of a second surgery. The patient voiced understanding and had all of her questions answered. The possibility of incomplete removal of abnormal tissue was discussed.       ALLERGIES:  Allergies as of 2022 - Fully Reviewed 2022   Allergen Reaction Noted    Seasonal  2020       MEDICATIONS:  Current Facility-Administered Medications   Medication Dose Route Frequency Provider Last Rate Last Admin    0.9 % sodium chloride infusion   IntraVENous Continuous Suzan Chris, PA-C        sodium chloride flush 0.9 % injection 5-40 mL  5-40 mL IntraVENous 2 times per day Suzan Chris, PA-C        sodium chloride flush 0.9 % injection 5-40 mL  5-40 mL IntraVENous PRN Suzan Chris, PA-C        0.9 % sodium chloride infusion   IntraVENous PRN Suzan Chris PA-C        ceFAZolin (ANCEF) 2000 mg in sterile water 20 mL IV syringe  2,000 mg [facility]VENous [facility]        bupivacaine liposome (EXPAREL) 20 ml & 0.25%  bupivacaine 20 ml in syringe   Infiltration Once Luis Oliveira MD        lactated ringers infusion   IntraVENous Continuous [facility] Martha Victor  mL/hr at 12/14/22 1013 New Bag at 12/14/22 1013         VITALS:  Vitals:    12/14/22 0920   BP: (!) 147/60   Pulse: 65   Resp: 18   Temp: 97.3 °F (36.3 °C)   SpO2: 98%       PHYSICAL EXAM:   General Appearance: Appears healthy. Alert; in no acute distress. Pleasant. Respiratory: Normal expansion. Clear to auscultation. No rales, rhonchi, or wheezing.   Cardiovascular: normal rate, normal S1 and S2, no gallops, intact distal pulses and no carotid bruits  Musculoskeletal: no gross abnormalities  Extremities: non-tender BLE and non-edematous  Psych:  oriented to time, place and person                                                                                                                               LAB RESULTS:  Admission on 12/14/2022   Component Date Value Ref Range Status    Ventricular Rate 12/14/2022 70  BPM Preliminary    Atrial Rate 12/14/2022 70  BPM Preliminary    P-R Interval 12/14/2022 150  ms Preliminary    QRS Duration 12/14/2022 72  ms Preliminary    Q-T Interval 12/14/2022 394  ms Preliminary    QTc Calculation (Bazett) 12/14/2022 425  ms Preliminary    P Axis 12/14/2022 39  degrees Preliminary    R Axis 12/14/2022 -6  degrees Preliminary    T Axis 12/14/2022 7  degrees Preliminary    WBC 12/14/2022 8.2  3.5 - 11.3 k/uL Final    RBC 12/14/2022 4.60  3.95 - 5.11 m/uL Final    Hemoglobin 12/14/2022 14.0  11.9 - 15.1 g/dL Final    Hematocrit 12/14/2022 42.2  36.3 - 47.1 % Final    MCV 12/14/2022 91.7  82.6 - 102.9 fL Final    MCH 12/14/2022 30.4  25.2 - 33.5 pg Final    MCHC 12/14/2022 33.2  28.4 - 34.8 g/dL Final    RDW 12/14/2022 12.6  11.8 - 14.4 % Final    Platelets 38/86/8051 187  138 - 453 k/uL Final    MPV 12/14/2022 11.0  8.1 - 13.5 fL Final    NRBC Automated 12/14/2022 0.0  0.0 per 100 WBC Final    Seg Neutrophils 12/14/2022 68 (A)  36 - 65 % Final    Lymphocytes 12/14/2022 21 (A)  24 - 43 % Final    Monocytes 12/14/2022 9  3 - 12 % Final    Eosinophils % 12/14/2022 1 1 - 4 % Final    Basophils 12/14/2022 1  0 - 2 % Final    Immature Granulocytes 12/14/2022 0  0 % Final    Segs Absolute 12/14/2022 5.56  1.50 - 8.10 k/uL Final    Absolute Lymph # 12/14/2022 1.73  1.10 - 3.70 k/uL Final    Absolute Mono # 12/14/2022 0.74  0.10 - 1.20 k/uL Final    Absolute Eos # 12/14/2022 0.10  0.00 - 0.44 k/uL Final    Basophils Absolute 12/14/2022 0.06  0.00 - 0.20 k/uL Final    Absolute Immature Granulocyte 12/14/2022 <0.03  0.00 - 0.30 k/uL Final   Hospital Outpatient Visit on 12/02/2022   Component Date Value Ref Range Status    Color, UA 12/02/2022 Yellow  Yellow Final    Turbidity UA 12/02/2022 SLIGHTLY CLOUDY (A)  Clear Final    Glucose, Ur 12/02/2022 NEGATIVE  NEGATIVE Final    Bilirubin Urine 12/02/2022 MODERATE (A)  NEGATIVE Final    Ketones, Urine 12/02/2022 TRACE (A)  NEGATIVE Final    Specific Gravity, UA 12/02/2022 >1.030 (A)  1.010 - 1.020 Final    Urine Hgb 12/02/2022 1+ (A)  NEGATIVE Final    pH, UA 12/02/2022 6.0  5.0 - 9.0 Final    Protein, UA 12/02/2022 1+ (A)  NEGATIVE Final    Urobilinogen, Urine 12/02/2022 Normal  Normal Final    Nitrite, Urine 12/02/2022 NEGATIVE  NEGATIVE Final    Leukocyte Esterase, Urine 12/02/2022 SMALL (A)  NEGATIVE Final    WBC, UA 12/02/2022 10 TO 20  0 - 5 /HPF Final    RBC, UA 12/02/2022 5 TO 10  0 - 2 /HPF Final    Epithelial Cells UA 12/02/2022 2 TO 5  0 - 25 /HPF Final    Bacteria, UA 12/02/2022 4+ (A)  None Final    Mucus, UA 12/02/2022 3+ (A)  None Final    Specimen Description 12/02/2022 . VOIDED URINE   Final    Culture 12/02/2022 NO SIGNIFICANT GROWTH   Final   Hospital Outpatient Visit on 11/17/2022   Component Date Value Ref Range Status     11/17/2022 11  <38 U/mL Final    Comment: The Roche \"ECLIA\" assay is used. Results obtained with different assay methods cannot be   used interchangeable.          DIAGNOSTICS:  XR CHEST (2 VW)    Result Date: 12/10/2022  EXAMINATION: TWO XRAY VIEWS OF THE CHEST 12/8/2022 12:35 pm COMPARISON: 03/30/2000 HISTORY: ORDERING SYSTEM PROVIDED HISTORY: Endometrial cancer [facility]) TECHNOLOGIST PROVIDED HISTORY: pre op FINDINGS: The lungs are without acute focal process. There is no effusion or pneumothorax. The cardiomediastinal silhouette is stable. The osseous structures are stable. No acute process. DIAGNOSIS & PLAN:  1. Adenocarcinoma, Endometrioid grade 1   - Proceed with planned procedure: Robotic assisted laparoscopic total hysterectomy, Bilateral salpingo-oophorectomy, pelvic sentinel lymph node biopsy, possible exploratory laparotomy, all other necessary procedures   - Consent signed, on chart. - The patient is ready for transport to the operative suite. Counseling: The patient was counseled on all options both medical and surgical, conservative as well as definitive. She has elected to proceed with the procedure as stated above. The patient was counseled on the procedure. Risks and complications were reviewed in detail. The patients orders, labs, consents have been completed. The history and physical as well as all supporting surgical documentation will be forwarded to the pre-operative holding area. The patient is aware that this procedure may not alleviate her symptoms. That there may be a necessity for a second surgery and that there may be an incomplete removal of abnormal tissue.     Dany Ma, DO  Gyn/Onc Resident   [facility], [address]  12/14/2022, 10:13 AM

## 2022-12-14 ENCOUNTER — ANESTHESIA EVENT (OUTPATIENT)
Dept: OPERATING ROOM | Age: 77
End: 2022-12-14
Payer: MEDICARE

## 2022-12-14 ENCOUNTER — ANESTHESIA (OUTPATIENT)
Dept: OPERATING ROOM | Age: 77
End: 2022-12-14
Payer: MEDICARE

## 2022-12-14 ENCOUNTER — HOSPITAL ENCOUNTER (OUTPATIENT)
Age: 77
Setting detail: OBSERVATION
Discharge: HOME | End: 2022-12-15
Attending: OBSTETRICS & GYNECOLOGY | Admitting: OBSTETRICS & GYNECOLOGY
Payer: MEDICARE

## 2022-12-14 DIAGNOSIS — C54.1 ENDOMETRIAL CANCER (HCC): ICD-10-CM

## 2022-12-14 PROBLEM — Z98.890 POST-OPERATIVE STATE: Status: ACTIVE | Noted: 2022-12-14

## 2022-12-14 LAB
ABO/RH: NORMAL
ABSOLUTE EOS #: 0.1 K/UL (ref 0–0.44)
ABSOLUTE IMMATURE GRANULOCYTE: <0.03 K/UL (ref 0–0.3)
ABSOLUTE LYMPH #: 1.73 K/UL (ref 1.1–3.7)
ABSOLUTE MONO #: 0.74 K/UL (ref 0.1–1.2)
ALBUMIN SERPL-MCNC: 4.1 G/DL (ref 3.5–5.2)
ALBUMIN/GLOBULIN RATIO: 1.6 (ref 1–2.5)
ALP BLD-CCNC: 86 U/L (ref 35–104)
ALT SERPL-CCNC: 9 U/L (ref 5–33)
ANION GAP SERPL CALCULATED.3IONS-SCNC: 13 MMOL/L (ref 9–17)
ANTIBODY SCREEN: NEGATIVE
ARM BAND NUMBER: NORMAL
AST SERPL-CCNC: 15 U/L
BASOPHILS # BLD: 1 % (ref 0–2)
BASOPHILS ABSOLUTE: 0.06 K/UL (ref 0–0.2)
BILIRUB SERPL-MCNC: 1 MG/DL (ref 0.3–1.2)
BUN BLDV-MCNC: 13 MG/DL (ref 8–23)
CALCIUM SERPL-MCNC: 9.5 MG/DL (ref 8.6–10.4)
CHLORIDE BLD-SCNC: 107 MMOL/L (ref 98–107)
CO2: 20 MMOL/L (ref 20–31)
CREAT SERPL-MCNC: 0.79 MG/DL (ref 0.5–0.9)
EKG ATRIAL RATE: 70 BPM
EKG P AXIS: 39 DEGREES
EKG P-R INTERVAL: 150 MS
EKG Q-T INTERVAL: 394 MS
EKG QRS DURATION: 72 MS
EKG QTC CALCULATION (BAZETT): 425 MS
EKG R AXIS: -6 DEGREES
EKG T AXIS: 7 DEGREES
EKG VENTRICULAR RATE: 70 BPM
EOSINOPHILS RELATIVE PERCENT: 1 % (ref 1–4)
EXPIRATION DATE: NORMAL
GFR SERPL CREATININE-BSD FRML MDRD: >60 ML/MIN/1.73M2
GLUCOSE BLD-MCNC: 98 MG/DL (ref 70–99)
HCT VFR BLD CALC: 42.2 % (ref 36.3–47.1)
HEMOGLOBIN: 14 G/DL (ref 11.9–15.1)
IMMATURE GRANULOCYTES: 0 %
LYMPHOCYTES # BLD: 21 % (ref 24–43)
MCH RBC QN AUTO: 30.4 PG (ref 25.2–33.5)
MCHC RBC AUTO-ENTMCNC: 33.2 G/DL (ref 28.4–34.8)
MCV RBC AUTO: 91.7 FL (ref 82.6–102.9)
MONOCYTES # BLD: 9 % (ref 3–12)
NRBC AUTOMATED: 0 PER 100 WBC
PDW BLD-RTO: 12.6 % (ref 11.8–14.4)
PLATELET # BLD: 187 K/UL (ref 138–453)
PMV BLD AUTO: 11 FL (ref 8.1–13.5)
POTASSIUM SERPL-SCNC: 4.2 MMOL/L (ref 3.7–5.3)
RBC # BLD: 4.6 M/UL (ref 3.95–5.11)
SEG NEUTROPHILS: 68 % (ref 36–65)
SEGMENTED NEUTROPHILS ABSOLUTE COUNT: 5.56 K/UL (ref 1.5–8.1)
SODIUM BLD-SCNC: 140 MMOL/L (ref 135–144)
TOTAL PROTEIN: 6.7 G/DL (ref 6.4–8.3)
WBC # BLD: 8.2 K/UL (ref 3.5–11.3)

## 2022-12-14 PROCEDURE — 6360000002 HC RX W HCPCS: Performed by: NURSE ANESTHETIST, CERTIFIED REGISTERED

## 2022-12-14 PROCEDURE — 2580000003 HC RX 258: Performed by: OBSTETRICS & GYNECOLOGY

## 2022-12-14 PROCEDURE — 2500000003 HC RX 250 WO HCPCS: Performed by: OBSTETRICS & GYNECOLOGY

## 2022-12-14 PROCEDURE — 86850 RBC ANTIBODY SCREEN: CPT

## 2022-12-14 PROCEDURE — 6360000002 HC RX W HCPCS: Performed by: STUDENT IN AN ORGANIZED HEALTH CARE EDUCATION/TRAINING PROGRAM

## 2022-12-14 PROCEDURE — 3600000019 HC SURGERY ROBOT ADDTL 15MIN: Performed by: OBSTETRICS & GYNECOLOGY

## 2022-12-14 PROCEDURE — 93010 ELECTROCARDIOGRAM REPORT: CPT | Performed by: INTERNAL MEDICINE

## 2022-12-14 PROCEDURE — 88307 TISSUE EXAM BY PATHOLOGIST: CPT

## 2022-12-14 PROCEDURE — C9290 INJ, BUPIVACAINE LIPOSOME: HCPCS | Performed by: OBSTETRICS & GYNECOLOGY

## 2022-12-14 PROCEDURE — 86900 BLOOD TYPING SEROLOGIC ABO: CPT

## 2022-12-14 PROCEDURE — 3600000009 HC SURGERY ROBOT BASE: Performed by: OBSTETRICS & GYNECOLOGY

## 2022-12-14 PROCEDURE — 7100000000 HC PACU RECOVERY - FIRST 15 MIN: Performed by: OBSTETRICS & GYNECOLOGY

## 2022-12-14 PROCEDURE — 3700000000 HC ANESTHESIA ATTENDED CARE: Performed by: OBSTETRICS & GYNECOLOGY

## 2022-12-14 PROCEDURE — S2900 ROBOTIC SURGICAL SYSTEM: HCPCS | Performed by: OBSTETRICS & GYNECOLOGY

## 2022-12-14 PROCEDURE — 6360000002 HC RX W HCPCS: Performed by: OBSTETRICS & GYNECOLOGY

## 2022-12-14 PROCEDURE — 6370000000 HC RX 637 (ALT 250 FOR IP): Performed by: STUDENT IN AN ORGANIZED HEALTH CARE EDUCATION/TRAINING PROGRAM

## 2022-12-14 PROCEDURE — 88309 TISSUE EXAM BY PATHOLOGIST: CPT

## 2022-12-14 PROCEDURE — 2580000003 HC RX 258: Performed by: ANESTHESIOLOGY

## 2022-12-14 PROCEDURE — G0378 HOSPITAL OBSERVATION PER HR: HCPCS

## 2022-12-14 PROCEDURE — 80053 COMPREHEN METABOLIC PANEL: CPT

## 2022-12-14 PROCEDURE — 6360000002 HC RX W HCPCS: Performed by: PHYSICIAN ASSISTANT

## 2022-12-14 PROCEDURE — 2709999900 HC NON-CHARGEABLE SUPPLY: Performed by: OBSTETRICS & GYNECOLOGY

## 2022-12-14 PROCEDURE — 86901 BLOOD TYPING SEROLOGIC RH(D): CPT

## 2022-12-14 PROCEDURE — 3700000001 HC ADD 15 MINUTES (ANESTHESIA): Performed by: OBSTETRICS & GYNECOLOGY

## 2022-12-14 PROCEDURE — 6360000002 HC RX W HCPCS: Performed by: ANESTHESIOLOGY

## 2022-12-14 PROCEDURE — 85025 COMPLETE CBC W/AUTO DIFF WBC: CPT

## 2022-12-14 PROCEDURE — 7100000001 HC PACU RECOVERY - ADDTL 15 MIN: Performed by: OBSTETRICS & GYNECOLOGY

## 2022-12-14 PROCEDURE — 2580000003 HC RX 258: Performed by: STUDENT IN AN ORGANIZED HEALTH CARE EDUCATION/TRAINING PROGRAM

## 2022-12-14 PROCEDURE — 93005 ELECTROCARDIOGRAM TRACING: CPT | Performed by: OBSTETRICS & GYNECOLOGY

## 2022-12-14 PROCEDURE — 2500000003 HC RX 250 WO HCPCS: Performed by: NURSE ANESTHETIST, CERTIFIED REGISTERED

## 2022-12-14 RX ORDER — ESCITALOPRAM OXALATE 10 MG/1
5 TABLET ORAL DAILY
Status: DISCONTINUED | OUTPATIENT
Start: 2022-12-14 | End: 2022-12-15 | Stop reason: HOSPADM

## 2022-12-14 RX ORDER — IBUPROFEN 600 MG/1
600 TABLET ORAL EVERY 6 HOURS
Status: DISCONTINUED | OUTPATIENT
Start: 2022-12-15 | End: 2022-12-15 | Stop reason: HOSPADM

## 2022-12-14 RX ORDER — SODIUM CHLORIDE 9 MG/ML
INJECTION, SOLUTION INTRAVENOUS PRN
Status: DISCONTINUED | OUTPATIENT
Start: 2022-12-14 | End: 2022-12-14 | Stop reason: HOSPADM

## 2022-12-14 RX ORDER — ACETAMINOPHEN 500 MG
1000 TABLET ORAL EVERY 6 HOURS PRN
Qty: 120 TABLET | Refills: 0 | Status: SHIPPED | OUTPATIENT
Start: 2022-12-14 | End: 2023-01-13

## 2022-12-14 RX ORDER — SODIUM CHLORIDE 0.9 % (FLUSH) 0.9 %
5-40 SYRINGE (ML) INJECTION PRN
Status: DISCONTINUED | OUTPATIENT
Start: 2022-12-14 | End: 2022-12-14 | Stop reason: HOSPADM

## 2022-12-14 RX ORDER — SODIUM CHLORIDE 0.9 % (FLUSH) 0.9 %
5-40 SYRINGE (ML) INJECTION PRN
Status: DISCONTINUED | OUTPATIENT
Start: 2022-12-14 | End: 2022-12-15 | Stop reason: HOSPADM

## 2022-12-14 RX ORDER — SIMETHICONE 80 MG
80 TABLET,CHEWABLE ORAL 4 TIMES DAILY PRN
Qty: 40 TABLET | Refills: 0 | Status: SHIPPED | OUTPATIENT
Start: 2022-12-14 | End: 2022-12-24

## 2022-12-14 RX ORDER — PHENYLEPHRINE HCL IN 0.9% NACL 1 MG/10 ML
SYRINGE (ML) INTRAVENOUS PRN
Status: DISCONTINUED | OUTPATIENT
Start: 2022-12-14 | End: 2022-12-14 | Stop reason: SDUPTHER

## 2022-12-14 RX ORDER — PROCHLORPERAZINE EDISYLATE 5 MG/ML
10 INJECTION INTRAMUSCULAR; INTRAVENOUS EVERY 6 HOURS PRN
Status: DISCONTINUED | OUTPATIENT
Start: 2022-12-14 | End: 2022-12-15 | Stop reason: HOSPADM

## 2022-12-14 RX ORDER — SODIUM CHLORIDE 9 MG/ML
INJECTION, SOLUTION INTRAVENOUS PRN
Status: DISCONTINUED | OUTPATIENT
Start: 2022-12-14 | End: 2022-12-15 | Stop reason: HOSPADM

## 2022-12-14 RX ORDER — HYDRALAZINE HYDROCHLORIDE 20 MG/ML
10 INJECTION INTRAMUSCULAR; INTRAVENOUS
Status: DISCONTINUED | OUTPATIENT
Start: 2022-12-14 | End: 2022-12-14 | Stop reason: HOSPADM

## 2022-12-14 RX ORDER — SODIUM CHLORIDE 0.9 % (FLUSH) 0.9 %
5-40 SYRINGE (ML) INJECTION EVERY 12 HOURS SCHEDULED
Status: DISCONTINUED | OUTPATIENT
Start: 2022-12-14 | End: 2022-12-15 | Stop reason: HOSPADM

## 2022-12-14 RX ORDER — KETOROLAC TROMETHAMINE 30 MG/ML
INJECTION, SOLUTION INTRAMUSCULAR; INTRAVENOUS PRN
Status: DISCONTINUED | OUTPATIENT
Start: 2022-12-14 | End: 2022-12-14 | Stop reason: SDUPTHER

## 2022-12-14 RX ORDER — ONDANSETRON 4 MG/1
4 TABLET, ORALLY DISINTEGRATING ORAL 3 TIMES DAILY PRN
Qty: 21 TABLET | Refills: 0 | Status: SHIPPED | OUTPATIENT
Start: 2022-12-14

## 2022-12-14 RX ORDER — MEPERIDINE HYDROCHLORIDE 50 MG/ML
12.5 INJECTION INTRAMUSCULAR; INTRAVENOUS; SUBCUTANEOUS ONCE
Status: COMPLETED | OUTPATIENT
Start: 2022-12-14 | End: 2022-12-14

## 2022-12-14 RX ORDER — OXYCODONE HYDROCHLORIDE 5 MG/1
5 TABLET ORAL EVERY 4 HOURS PRN
Status: DISCONTINUED | OUTPATIENT
Start: 2022-12-14 | End: 2022-12-15 | Stop reason: HOSPADM

## 2022-12-14 RX ORDER — LIDOCAINE HYDROCHLORIDE 10 MG/ML
INJECTION, SOLUTION EPIDURAL; INFILTRATION; INTRACAUDAL; PERINEURAL PRN
Status: DISCONTINUED | OUTPATIENT
Start: 2022-12-14 | End: 2022-12-14 | Stop reason: SDUPTHER

## 2022-12-14 RX ORDER — INDOCYANINE GREEN AND WATER 25 MG
KIT INJECTION PRN
Status: DISCONTINUED | OUTPATIENT
Start: 2022-12-14 | End: 2022-12-14 | Stop reason: HOSPADM

## 2022-12-14 RX ORDER — ONDANSETRON 2 MG/ML
INJECTION INTRAMUSCULAR; INTRAVENOUS PRN
Status: DISCONTINUED | OUTPATIENT
Start: 2022-12-14 | End: 2022-12-14 | Stop reason: SDUPTHER

## 2022-12-14 RX ORDER — LABETALOL HYDROCHLORIDE 5 MG/ML
10 INJECTION, SOLUTION INTRAVENOUS
Status: DISCONTINUED | OUTPATIENT
Start: 2022-12-14 | End: 2022-12-14 | Stop reason: HOSPADM

## 2022-12-14 RX ORDER — DIPHENHYDRAMINE HCL 25 MG
25 TABLET ORAL EVERY 6 HOURS PRN
Status: DISCONTINUED | OUTPATIENT
Start: 2022-12-14 | End: 2022-12-15 | Stop reason: HOSPADM

## 2022-12-14 RX ORDER — ONDANSETRON 2 MG/ML
4 INJECTION INTRAMUSCULAR; INTRAVENOUS
Status: DISCONTINUED | OUTPATIENT
Start: 2022-12-14 | End: 2022-12-14 | Stop reason: HOSPADM

## 2022-12-14 RX ORDER — SODIUM CHLORIDE 0.9 % (FLUSH) 0.9 %
5-40 SYRINGE (ML) INJECTION EVERY 12 HOURS SCHEDULED
Status: DISCONTINUED | OUTPATIENT
Start: 2022-12-14 | End: 2022-12-14 | Stop reason: HOSPADM

## 2022-12-14 RX ORDER — SODIUM CHLORIDE, SODIUM LACTATE, POTASSIUM CHLORIDE, CALCIUM CHLORIDE 600; 310; 30; 20 MG/100ML; MG/100ML; MG/100ML; MG/100ML
INJECTION, SOLUTION INTRAVENOUS CONTINUOUS
Status: DISCONTINUED | OUTPATIENT
Start: 2022-12-14 | End: 2022-12-14 | Stop reason: HOSPADM

## 2022-12-14 RX ORDER — ACETAMINOPHEN 500 MG
1000 TABLET ORAL EVERY 6 HOURS
Status: DISCONTINUED | OUTPATIENT
Start: 2022-12-14 | End: 2022-12-15 | Stop reason: HOSPADM

## 2022-12-14 RX ORDER — NEOSTIGMINE METHYLSULFATE 5 MG/5 ML
SYRINGE (ML) INTRAVENOUS PRN
Status: DISCONTINUED | OUTPATIENT
Start: 2022-12-14 | End: 2022-12-14 | Stop reason: SDUPTHER

## 2022-12-14 RX ORDER — SENNA AND DOCUSATE SODIUM 50; 8.6 MG/1; MG/1
2 TABLET, FILM COATED ORAL 2 TIMES DAILY
Status: DISCONTINUED | OUTPATIENT
Start: 2022-12-14 | End: 2022-12-15 | Stop reason: HOSPADM

## 2022-12-14 RX ORDER — PROPOFOL 10 MG/ML
INJECTION, EMULSION INTRAVENOUS PRN
Status: DISCONTINUED | OUTPATIENT
Start: 2022-12-14 | End: 2022-12-14 | Stop reason: SDUPTHER

## 2022-12-14 RX ORDER — ROCURONIUM BROMIDE 10 MG/ML
INJECTION, SOLUTION INTRAVENOUS PRN
Status: DISCONTINUED | OUTPATIENT
Start: 2022-12-14 | End: 2022-12-14 | Stop reason: SDUPTHER

## 2022-12-14 RX ORDER — DEXAMETHASONE SODIUM PHOSPHATE 10 MG/ML
INJECTION INTRAMUSCULAR; INTRAVENOUS PRN
Status: DISCONTINUED | OUTPATIENT
Start: 2022-12-14 | End: 2022-12-14 | Stop reason: SDUPTHER

## 2022-12-14 RX ORDER — FENTANYL CITRATE 50 UG/ML
INJECTION, SOLUTION INTRAMUSCULAR; INTRAVENOUS PRN
Status: DISCONTINUED | OUTPATIENT
Start: 2022-12-14 | End: 2022-12-14 | Stop reason: SDUPTHER

## 2022-12-14 RX ORDER — IBUPROFEN 600 MG/1
600 TABLET ORAL EVERY 6 HOURS PRN
Qty: 30 TABLET | Refills: 0 | Status: SHIPPED | OUTPATIENT
Start: 2022-12-14

## 2022-12-14 RX ORDER — SODIUM CHLORIDE 9 MG/ML
INJECTION, SOLUTION INTRAVENOUS CONTINUOUS
Status: DISCONTINUED | OUTPATIENT
Start: 2022-12-14 | End: 2022-12-15

## 2022-12-14 RX ORDER — ONDANSETRON 2 MG/ML
4 INJECTION INTRAMUSCULAR; INTRAVENOUS EVERY 6 HOURS PRN
Status: DISCONTINUED | OUTPATIENT
Start: 2022-12-14 | End: 2022-12-15 | Stop reason: HOSPADM

## 2022-12-14 RX ORDER — OXYCODONE HYDROCHLORIDE 5 MG/1
10 TABLET ORAL EVERY 4 HOURS PRN
Status: DISCONTINUED | OUTPATIENT
Start: 2022-12-14 | End: 2022-12-15 | Stop reason: HOSPADM

## 2022-12-14 RX ORDER — MAGNESIUM SULFATE 1 G/100ML
INJECTION INTRAVENOUS PRN
Status: DISCONTINUED | OUTPATIENT
Start: 2022-12-14 | End: 2022-12-14 | Stop reason: SDUPTHER

## 2022-12-14 RX ORDER — MAGNESIUM HYDROXIDE/ALUMINUM HYDROXICE/SIMETHICONE 120; 1200; 1200 MG/30ML; MG/30ML; MG/30ML
30 SUSPENSION ORAL EVERY 6 HOURS PRN
Status: DISCONTINUED | OUTPATIENT
Start: 2022-12-14 | End: 2022-12-15 | Stop reason: HOSPADM

## 2022-12-14 RX ORDER — CALCIUM CARBONATE 200(500)MG
500 TABLET,CHEWABLE ORAL 3 TIMES DAILY PRN
Status: DISCONTINUED | OUTPATIENT
Start: 2022-12-14 | End: 2022-12-15 | Stop reason: HOSPADM

## 2022-12-14 RX ORDER — ENOXAPARIN SODIUM 100 MG/ML
40 INJECTION SUBCUTANEOUS DAILY
Status: DISCONTINUED | OUTPATIENT
Start: 2022-12-15 | End: 2022-12-15 | Stop reason: HOSPADM

## 2022-12-14 RX ORDER — ONDANSETRON 4 MG/1
4 TABLET, ORALLY DISINTEGRATING ORAL EVERY 8 HOURS PRN
Status: DISCONTINUED | OUTPATIENT
Start: 2022-12-14 | End: 2022-12-15 | Stop reason: HOSPADM

## 2022-12-14 RX ORDER — MAGNESIUM HYDROXIDE 1200 MG/15ML
LIQUID ORAL CONTINUOUS PRN
Status: DISCONTINUED | OUTPATIENT
Start: 2022-12-14 | End: 2022-12-14 | Stop reason: HOSPADM

## 2022-12-14 RX ORDER — GLYCOPYRROLATE 0.2 MG/ML
INJECTION INTRAMUSCULAR; INTRAVENOUS PRN
Status: DISCONTINUED | OUTPATIENT
Start: 2022-12-14 | End: 2022-12-14 | Stop reason: SDUPTHER

## 2022-12-14 RX ORDER — SODIUM CHLORIDE 9 MG/ML
INJECTION, SOLUTION INTRAVENOUS CONTINUOUS
Status: DISCONTINUED | OUTPATIENT
Start: 2022-12-14 | End: 2022-12-14 | Stop reason: HOSPADM

## 2022-12-14 RX ORDER — SENNA AND DOCUSATE SODIUM 50; 8.6 MG/1; MG/1
1 TABLET, FILM COATED ORAL DAILY
Qty: 14 TABLET | Refills: 0 | Status: SHIPPED | OUTPATIENT
Start: 2022-12-14 | End: 2022-12-28

## 2022-12-14 RX ORDER — HYDRALAZINE HYDROCHLORIDE 20 MG/ML
INJECTION INTRAMUSCULAR; INTRAVENOUS PRN
Status: DISCONTINUED | OUTPATIENT
Start: 2022-12-14 | End: 2022-12-14 | Stop reason: SDUPTHER

## 2022-12-14 RX ORDER — KETOROLAC TROMETHAMINE 15 MG/ML
15 INJECTION, SOLUTION INTRAMUSCULAR; INTRAVENOUS EVERY 6 HOURS
Status: DISPENSED | OUTPATIENT
Start: 2022-12-14 | End: 2022-12-15

## 2022-12-14 RX ORDER — LANOLIN ALCOHOL/MO/W.PET/CERES
3 CREAM (GRAM) TOPICAL NIGHTLY PRN
Status: DISCONTINUED | OUTPATIENT
Start: 2022-12-14 | End: 2022-12-15 | Stop reason: HOSPADM

## 2022-12-14 RX ADMIN — MAGNESIUM SULFATE HEPTAHYDRATE 1000 MG: 1 INJECTION, SOLUTION INTRAVENOUS at 11:40

## 2022-12-14 RX ADMIN — FENTANYL CITRATE 50 MCG: 50 INJECTION, SOLUTION INTRAMUSCULAR; INTRAVENOUS at 11:36

## 2022-12-14 RX ADMIN — KETOROLAC TROMETHAMINE 30 MG: 30 INJECTION, SOLUTION INTRAMUSCULAR at 13:07

## 2022-12-14 RX ADMIN — GLYCOPYRROLATE 0.4 MG: 0.2 INJECTION INTRAMUSCULAR; INTRAVENOUS at 13:07

## 2022-12-14 RX ADMIN — DEXAMETHASONE SODIUM PHOSPHATE 10 MG: 10 INJECTION INTRAMUSCULAR; INTRAVENOUS at 11:07

## 2022-12-14 RX ADMIN — ACETAMINOPHEN 1000 MG: 500 TABLET ORAL at 18:51

## 2022-12-14 RX ADMIN — ROCURONIUM BROMIDE 50 MG: 50 INJECTION, SOLUTION INTRAVENOUS at 10:58

## 2022-12-14 RX ADMIN — Medication 3 MG: at 13:07

## 2022-12-14 RX ADMIN — MEPERIDINE HYDROCHLORIDE 12.5 MG: 50 INJECTION, SOLUTION INTRAMUSCULAR; INTRAVENOUS; SUBCUTANEOUS at 15:42

## 2022-12-14 RX ADMIN — Medication 2000 MG: at 11:17

## 2022-12-14 RX ADMIN — HYDRALAZINE HYDROCHLORIDE 4 MG: 20 INJECTION, SOLUTION INTRAMUSCULAR; INTRAVENOUS at 12:11

## 2022-12-14 RX ADMIN — LIDOCAINE HYDROCHLORIDE 50 MG: 10 INJECTION, SOLUTION EPIDURAL; INFILTRATION; INTRACAUDAL; PERINEURAL at 11:38

## 2022-12-14 RX ADMIN — SODIUM CHLORIDE, POTASSIUM CHLORIDE, SODIUM LACTATE AND CALCIUM CHLORIDE: 600; 310; 30; 20 INJECTION, SOLUTION INTRAVENOUS at 11:36

## 2022-12-14 RX ADMIN — ESCITALOPRAM OXALATE 5 MG: 10 TABLET ORAL at 20:31

## 2022-12-14 RX ADMIN — SODIUM CHLORIDE, POTASSIUM CHLORIDE, SODIUM LACTATE AND CALCIUM CHLORIDE: 600; 310; 30; 20 INJECTION, SOLUTION INTRAVENOUS at 10:13

## 2022-12-14 RX ADMIN — DOCUSATE SODIUM 50 MG AND SENNOSIDES 8.6 MG 2 TABLET: 8.6; 5 TABLET, FILM COATED ORAL at 20:31

## 2022-12-14 RX ADMIN — SODIUM CHLORIDE: 9 INJECTION, SOLUTION INTRAVENOUS at 18:54

## 2022-12-14 RX ADMIN — FENTANYL CITRATE 50 MCG: 50 INJECTION, SOLUTION INTRAMUSCULAR; INTRAVENOUS at 11:42

## 2022-12-14 RX ADMIN — HYDROMORPHONE HYDROCHLORIDE 0.5 MG: 1 INJECTION, SOLUTION INTRAMUSCULAR; INTRAVENOUS; SUBCUTANEOUS at 13:47

## 2022-12-14 RX ADMIN — PROPOFOL 150 MG: 10 INJECTION, EMULSION INTRAVENOUS at 10:58

## 2022-12-14 RX ADMIN — LIDOCAINE HYDROCHLORIDE 50 MG: 10 INJECTION, SOLUTION EPIDURAL; INFILTRATION; INTRACAUDAL; PERINEURAL at 10:58

## 2022-12-14 RX ADMIN — HYDRALAZINE HYDROCHLORIDE 6 MG: 20 INJECTION, SOLUTION INTRAMUSCULAR; INTRAVENOUS at 12:16

## 2022-12-14 RX ADMIN — FENTANYL CITRATE 50 MCG: 50 INJECTION, SOLUTION INTRAMUSCULAR; INTRAVENOUS at 10:58

## 2022-12-14 RX ADMIN — ONDANSETRON 4 MG: 2 INJECTION INTRAMUSCULAR; INTRAVENOUS at 13:07

## 2022-12-14 RX ADMIN — KETOROLAC TROMETHAMINE 15 MG: 15 INJECTION, SOLUTION INTRAMUSCULAR; INTRAVENOUS at 20:30

## 2022-12-14 RX ADMIN — ROCURONIUM BROMIDE 10 MG: 50 INJECTION, SOLUTION INTRAVENOUS at 12:07

## 2022-12-14 RX ADMIN — FENTANYL CITRATE 50 MCG: 50 INJECTION, SOLUTION INTRAMUSCULAR; INTRAVENOUS at 12:07

## 2022-12-14 RX ADMIN — Medication 100 MCG: at 11:25

## 2022-12-14 ASSESSMENT — PAIN DESCRIPTION - LOCATION
LOCATION: ABDOMEN

## 2022-12-14 ASSESSMENT — PAIN SCALES - GENERAL
PAINLEVEL_OUTOF10: 8
PAINLEVEL_OUTOF10: 2
PAINLEVEL_OUTOF10: 1
PAINLEVEL_OUTOF10: 5
PAINLEVEL_OUTOF10: 3
PAINLEVEL_OUTOF10: 5
PAINLEVEL_OUTOF10: 4
PAINLEVEL_OUTOF10: 7

## 2022-12-14 ASSESSMENT — PAIN - FUNCTIONAL ASSESSMENT: PAIN_FUNCTIONAL_ASSESSMENT: 0-10

## 2022-12-14 NOTE — BRIEF OP NOTE
Brief Operative Note  Department of Gynecology/Oncology  [facility]     Patient: Alyx Phillips   :   MRN: [id number]       Acct: [de-identified]   Date of Procedure: 22     Pre-operative Diagnosis: 65 y.o. female    Adenocarcinoma, Endometrioid grade 1     Post-operative Diagnosis: Same    Procedure: Robotic assisted laparoscopic hysterectomy, bilateral salpingo-oophorectomy, pelvic sentinel lymph node dissection, cystoscopy    Surgeon: Dr. Romero Galvez     Assistant(s): Carlos Paula DO, PGY4; Chioma Owen, PGY2; Abimael Cleary PA-C    Anesthesia: general via ET    Findings:  Approximately 8 week size anteverted uterus. Normal appearing external genitalia, vaginal mucosa, and cervix. Normal appearing uterus, bilateral ovaries, and fallopian tubes. Total IV fluids/Blood products:  1600 ml crystalloid  Urine Output:  650 ml    Estimated blood loss:  25 ml  Drains:  none  Specimens: Uterus and cervix, bilateral tubes and ovaries, bilateral sentinel pelvic lymph nodes  Instrument and Sponge Count: Correct  Complications:  none  Condition:  stable, transferred to post anesthesia recovery    See full operative report for further details.     Carlos Paula DO  Ob/Gyn Resident  2022, 1:37 PM

## 2022-12-14 NOTE — ADDENDUM NOTE
Addendum  created 12/14/22 1446 by Mohit Day MD    Order list changed, Pharmacy for encounter modified

## 2022-12-14 NOTE — PROGRESS NOTES
Gyn Oncology Attending Note    Patient seen and evaluated by me today  Chart, notes,path, history records revd by me in detail  She tells me that all questions are answered today to her satisfaction  She tells me today that she understands the planned operation and all of the serious risks and downstream consequences    Proceed to OR today as scheduled        LEE Kaiser MD

## 2022-12-14 NOTE — ANESTHESIA PRE PROCEDURE
Department of Anesthesiology  Preprocedure Note       Name:  Lidia Peralta   Age:  65 y.o.  :  1945                                          MRN:  [id number]         Date:  2022      Surgeon: Shazia Ponce):  Molly Joshua MD    Procedure: Procedure(s):  XI ROBOTIC LAPAROSCOPIC TOTAL HYSTERECTOMY, BSO, PELVIC SENTINEL LYMPHNODE BIOPSY, POSSIBLE EXPLORATORY LAPAROTOMY OTHER NECESSARY PROCEDURES (ICG DYE)    Medications prior to admission:   Prior to Admission medications    Medication Sig Start Date End Date Taking? Authorizing Provider   ALPRAZolam Earlie Sicard) 1 MG tablet Take 1/2 to 1 tablet up to 3 times a day, orally, as needed for anxiety  Patient not taking: No sig reported 22  Moe Javier MD   escitalopram (LEXAPRO) 5 MG tablet Take 1 tablet by mouth daily 10/26/22   JOSEPH Flores - CNP   atorvastatin (LIPITOR) 20 MG tablet TAKE 1 TABLET BY MOUTH ONCE A DAY 21   Jamil Jackman MD   aspirin 325 MG tablet Take 325 mg by mouth daily    Historical Provider, MD   Multiple Vitamins-Minerals (HAIR SKIN AND NAILS FORMULA PO) Take by mouth daily Centrum silver    Historical Provider, MD       Current medications:    Current Facility-Administered Medications   Medication Dose Route Frequency Provider Last Rate Last Admin    0.9 % sodium chloride infusion   IntraVENous Continuous Suzan Chris, PA-C        sodium chloride flush 0.9 % injection 5-40 mL  5-40 mL IntraVENous 2 times per day Suzan Chris, PA-C        sodium chloride flush 0.9 % injection 5-40 mL  5-40 mL IntraVENous PRN Suzan Chris, PA-C        0.9 % sodium chloride infusion   IntraVENous PRN Suzan Chris, PA-C        ceFAZolin (ANCEF) 2000 mg in sterile water 20 mL IV syringe  2,000 mg IntraVENous [facility]           Allergies:     Allergies   Allergen Reactions    Seasonal        Problem List:    Patient Active Problem List   Diagnosis Code    Hypertension I10    Hyperlipidemia E78.5    GERD (gastroesophageal reflux disease) K21.9    Impaired fasting glucose R73.01       Past Medical History:        Diagnosis Date    Abnormal uterine bleeding (AUB) 2022    Arthritis     Endometrial adenocarcinoma ([address]     Endometrial thickening on ultrasound 2022    GERD (gastroesophageal reflux disease)     Hyperlipidemia     Hypertension     NO MEDS    Impaired fasting glucose     Under care of service provider 2022    [facility]-last visit oct 2022    Uses walker        Past Surgical History:        Procedure Laterality Date    ENDOMETRIAL BIOPSY  2022    Dr. 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                                Counseling given: Not Answered      Vital Signs (Current):   Vitals:    22 1114 22 0920   BP:  (!) 147/60   Pulse:  65   Resp:  18   Temp:  97.3 °F (36.3 °C)   TempSrc:  Temporal   SpO2:  98%   Weight: 174 lb (78.9 kg) 175 lb (79.4 kg)   Height: 5' 4\" (1.626 m) 5' 4\" (1.626 m)                                              BP Readings from Last 3 Encounters:   22 (!) 147/60   22 138/72   22 (!) 140/86       NPO Status: Time of last liquid consumption:                         Time of last solid consumption:                         Date of last liquid consumption: 22                        Date of last solid food consumption: 22    BMI:   Wt Readings from Last 3 Encounters:   22 175 lb

## 2022-12-14 NOTE — DISCHARGE SUMMARY
Gyn/Onc Discharge Summary  [facility]      Patient Name: Lawyer Huggins  Patient : 1945  Primary Care Physician: Karly Taveras MD  Admit Date: 2022    Principal Diagnosis: Postop management    Other Diagnosis:   Endometrial cancer ([facility] [address] [C54.1]  Post-operative state [Z98.890]  Patient Active Problem List   Diagnosis    Hypertension    Hyperlipidemia    GERD (gastroesophageal reflux disease)    Impaired fasting glucose    Endometrial cancer ([facility] [address]    Post-operative state       Infection: No  Hospital Acquired: No    Surgical Operations & Procedures: Robotic assisted laparoscopic hysterectomy, bilateral salpingo-oophorectomy, pelvic sentinel lymph node biopsies, cystoscopy    Consultations: none    Pertinent Findings & Procedures:   Lawyer Huggins is a 65 y.o. female , admitted for inpatient management of postoperative state; received ancef preop. POD#1: Hbg 12.6      She underwent RALH, BSO, Cysto, SLN biopsy on 22. Post-op course normal, discharged home. Follow up in 4 weeks. Discharge instructions reviewed and questions answered.     Course of patient: normal    Discharge to: Home    Readmission planned: No    Recommendations on Discharge:     Medications:     Medication List        START taking these medications      acetaminophen 500 MG tablet  Commonly known as: TYLENOL  Take 2 tablets by mouth every 6 hours as needed for Pain     ibuprofen 600 MG tablet  Commonly known as: ADVIL;MOTRIN  Take 1 tablet by mouth every 6 hours as needed for Pain     ondansetron 4 MG disintegrating tablet  Commonly known as: ZOFRAN-ODT  Take 1 tablet by mouth 3 times daily as needed for Nausea or Vomiting     sennosides-docusate sodium 8.6-50 MG tablet  Commonly known as: SENOKOT-S  Take 1 tablet by mouth daily for 14 days     simethicone 80 MG chewable tablet  Commonly known as: MYLICON  Take 1 tablet by mouth 4 times daily as needed for Flatulence            CONTINUE taking these medications      aspirin 325 MG tablet     atorvastatin 20 MG tablet  Commonly known as: LIPITOR  TAKE 1 TABLET BY MOUTH ONCE A DAY     escitalopram 5 MG tablet  Commonly known as: LEXAPRO  Take 1 tablet by mouth daily     HAIR SKIN AND NAILS FORMULA PO            ASK your doctor about these medications      ALPRAZolam 1 MG tablet  Commonly known as: Xanax  Take 1/2 to 1 tablet up to 3 times a day, orally, as needed for anxiety               Where to Get Your Medications        These medications were sent to [address], [address]  [address]      Phone: [phone]   acetaminophen 500 MG tablet  ibuprofen 600 MG tablet  ondansetron 4 MG disintegrating tablet  sennosides-docusate sodium 8.6-50 MG tablet  simethicone 80 MG chewable tablet           Activity: pelvic rest x 6 weeks, no driving on narcotics, no lifting greater than 10 lbs  Diet: regular diet  Follow up: 4 weeks     Condition on discharge: good and stable   Discharge Date: 12/15/22    Comments:  Home care, Follow-up care, restrictions reviewed.     Jose Chappell DO  Ob/Gyn Resident  [address] [facility]  12/15/2022, 8:19 AM

## 2022-12-14 NOTE — ANESTHESIA POSTPROCEDURE EVALUATION
Department of Anesthesiology  Postprocedure Note    Patient: John Retana  MRN: [id number]  YOB: 1945  Date of evaluation: 12/14/2022      Procedure Summary     Date: 12/14/22 Room / Location: [place] [address] / [facility]    Anesthesia Start: 1053 Anesthesia Stop: 8285    Procedure: XI ROBOTIC LAPAROSCOPIC TOTAL HYSTERECTOMY, BSO, PELVIC SENTINEL LYMPHNODE BIOPSY, POSSIBLE EXPLORATORY LAPAROTOMY OTHER NECESSARY PROCEDURES (ICG DYE) Diagnosis:       Endometrial cancer ([facility] [address]      (ENDOMETRIAL CANCER)    Surgeons: Leonor Doe MD Responsible Provider: Yuliet Ryan MD    Anesthesia Type: general ASA Status: 3          Anesthesia Type: No value filed.     Morris Phase I: Morris Score: 9    Morris Phase II:        Anesthesia Post Evaluation    Patient location during evaluation: PACU  Patient participation: complete - patient participated  Level of consciousness: awake  Airway patency: patent  Nausea & Vomiting: no nausea and no vomiting  Complications: no  Cardiovascular status: blood pressure returned to baseline  Respiratory status: acceptable  Hydration status: euvolemic  Comments: BP (!) 147/60   Pulse 75   Temp 97.3 °F (36.3 °C) (Temporal)   Resp 16   Ht 5' 4\" (1.626 m)   Wt 175 lb (79.4 kg)   SpO2 98%   BMI 30.04 kg/m²

## 2022-12-15 VITALS
SYSTOLIC BLOOD PRESSURE: 119 MMHG | BODY MASS INDEX: 29.88 KG/M2 | HEART RATE: 66 BPM | RESPIRATION RATE: 16 BRPM | WEIGHT: 175 LBS | TEMPERATURE: 98.2 F | OXYGEN SATURATION: 96 % | HEIGHT: 64 IN | DIASTOLIC BLOOD PRESSURE: 60 MMHG

## 2022-12-15 LAB
ABSOLUTE EOS #: <0.03 K/UL (ref 0–0.44)
ABSOLUTE IMMATURE GRANULOCYTE: 0.03 K/UL (ref 0–0.3)
ABSOLUTE LYMPH #: 0.95 K/UL (ref 1.1–3.7)
ABSOLUTE MONO #: 1 K/UL (ref 0.1–1.2)
BASOPHILS # BLD: 0 % (ref 0–2)
BASOPHILS ABSOLUTE: 0.03 K/UL (ref 0–0.2)
EOSINOPHILS RELATIVE PERCENT: 0 % (ref 1–4)
HCT VFR BLD CALC: 39 % (ref 36.3–47.1)
HEMOGLOBIN: 12.6 G/DL (ref 11.9–15.1)
IMMATURE GRANULOCYTES: 0 %
LYMPHOCYTES # BLD: 9 % (ref 24–43)
MCH RBC QN AUTO: 31 PG (ref 25.2–33.5)
MCHC RBC AUTO-ENTMCNC: 32.3 G/DL (ref 28.4–34.8)
MCV RBC AUTO: 96.1 FL (ref 82.6–102.9)
MONOCYTES # BLD: 9 % (ref 3–12)
NRBC AUTOMATED: 0 PER 100 WBC
PDW BLD-RTO: 12.9 % (ref 11.8–14.4)
PLATELET # BLD: 194 K/UL (ref 138–453)
PMV BLD AUTO: 11.2 FL (ref 8.1–13.5)
RBC # BLD: 4.06 M/UL (ref 3.95–5.11)
SEG NEUTROPHILS: 82 % (ref 36–65)
SEGMENTED NEUTROPHILS ABSOLUTE COUNT: 8.71 K/UL (ref 1.5–8.1)
WBC # BLD: 10.7 K/UL (ref 3.5–11.3)

## 2022-12-15 PROCEDURE — 85025 COMPLETE CBC W/AUTO DIFF WBC: CPT

## 2022-12-15 PROCEDURE — 6370000000 HC RX 637 (ALT 250 FOR IP): Performed by: STUDENT IN AN ORGANIZED HEALTH CARE EDUCATION/TRAINING PROGRAM

## 2022-12-15 PROCEDURE — G0378 HOSPITAL OBSERVATION PER HR: HCPCS

## 2022-12-15 PROCEDURE — 36415 COLL VENOUS BLD VENIPUNCTURE: CPT

## 2022-12-15 PROCEDURE — 96372 THER/PROPH/DIAG INJ SC/IM: CPT

## 2022-12-15 PROCEDURE — 2580000003 HC RX 258: Performed by: STUDENT IN AN ORGANIZED HEALTH CARE EDUCATION/TRAINING PROGRAM

## 2022-12-15 PROCEDURE — 6360000002 HC RX W HCPCS: Performed by: STUDENT IN AN ORGANIZED HEALTH CARE EDUCATION/TRAINING PROGRAM

## 2022-12-15 RX ADMIN — ACETAMINOPHEN 1000 MG: 500 TABLET ORAL at 06:32

## 2022-12-15 RX ADMIN — ACETAMINOPHEN 1000 MG: 500 TABLET ORAL at 00:54

## 2022-12-15 RX ADMIN — KETOROLAC TROMETHAMINE 15 MG: 15 INJECTION, SOLUTION INTRAMUSCULAR; INTRAVENOUS at 02:03

## 2022-12-15 RX ADMIN — ENOXAPARIN SODIUM 40 MG: 100 INJECTION SUBCUTANEOUS at 08:16

## 2022-12-15 RX ADMIN — SODIUM CHLORIDE: 9 INJECTION, SOLUTION INTRAVENOUS at 02:36

## 2022-12-15 ASSESSMENT — PAIN SCALES - GENERAL
PAINLEVEL_OUTOF10: 3
PAINLEVEL_OUTOF10: 4

## 2022-12-15 NOTE — PLAN OF CARE
Problem: Discharge Planning  Goal: Discharge to home or other facility with appropriate resources  Outcome: Progressing     Problem: Pain  Goal: Verbalizes/displays adequate comfort level or baseline comfort level  Outcome: Progressing     Problem: Safety - Adult  Goal: Free from fall injury  Outcome: Progressing     Problem: ABCDS Injury Assessment  Goal: Absence of physical injury  Outcome: Progressing  Flowsheets (Taken 12/14/2022 1945)  Absence of Physical Injury: Implement safety measures based on patient assessment

## 2022-12-15 NOTE — PROGRESS NOTES
Gynecology Oncology Progress Note      Jayden Garcia is a 65 y.o. female , POD # 1 S/p RALH, BSO, Cysto, SLN 22    Patient seen and examined. No acute events overnight. Pain is controlled. Patient is  tolerating oral intake. She is urinating adequate. She denies any vaginal bleeding. She is  ambulating well. She is  passing flatus. She denies Fever/Chills, Chest Pain, SOB, N/V, Calf Pain. Vitals:  Vitals:    22 1835 22 1945 12/15/22 0100 12/15/22 0415   BP: (!) 142/84 (!) 140/60 134/60 127/67   Pulse: 79 73 66 65   Resp: 20 18 16 16   Temp: 98.3 °F (36.8 °C) 98.1 °F (36.7 °C) 97.2 °F (36.2 °C) 97.3 °F (36.3 °C)   TempSrc: Oral Oral Oral Oral   SpO2:  95% 97% 95%   Weight:       Height:             Intake/Output:   Last Shift: I/O last 3 completed shifts: In: 2884.4 [P.O.:200; I.V.:2684.4]  Out: 1025 [Urine:1000; Blood:25]  Current Shift: No intake/output data recorded.     350 mL in the last 12 hrs of clear UOP ~30 mL/hr    Physical Exam:  General: Alert and oriented, no acute distress  HEENT: normocephalic, atraumatic, supple, symmetrical, trachea midline, Pupils equal and reactive to light, Extraocular muscles intact, sclera non icteric  Respiratory: Good air movement bilaterally, unlabored respirations, no wheezes or rhonchi  Cardiovascular: Regular rate and rhythm, no murmurs rubs or gallops  Abdomen:  soft, non tender, non distended, no rebound, guarding, or rigidity, bowel sounds normal  Incisions: C/D/I with dermabond  Extremities: No LE edema, no calf tenderness or swelling, EPCs on   Psych: affect appropriate    Lab:  Recent Results (from the past 12 hour(s))   CBC with Auto Differential    Collection Time: 12/15/22  5:04 AM   Result Value Ref Range    WBC 10.7 3.5 - 11.3 k/uL    RBC 4.06 3.95 - 5.11 m/uL    Hemoglobin 12.6 11.9 - 15.1 g/dL    Hematocrit 39.0 36.3 - 47.1 %    MCV 96.1 82.6 - 102.9 fL    MCH 31.0 25.2 - 33.5 pg    MCHC 32.3 28.4 - 34.8 g/dL    RDW 12.9 11.8 - 14.4 %    Platelets 322 470 - 440 k/uL    MPV 11.2 8.1 - 13.5 fL    NRBC Automated 0.0 0.0 per 100 WBC    Seg Neutrophils 82 (H) 36 - 65 %    Lymphocytes 9 (L) 24 - 43 %    Monocytes 9 3 - 12 %    Eosinophils % 0 (L) 1 - 4 %    Basophils 0 0 - 2 %    Immature Granulocytes 0 0 %    Segs Absolute 8.71 (H) 1.50 - 8.10 k/uL    Absolute Lymph # 0.95 (L) 1.10 - 3.70 k/uL    Absolute Mono # 1.00 0.10 - 1.20 k/uL    Absolute Eos # <0.03 0.00 - 0.44 k/uL    Basophils Absolute 0.03 0.00 - 0.20 k/uL    Absolute Immature Granulocyte 0.03 0.00 - 0.30 k/uL       Assessment/Plan:  Emerita Francois 65 y.o. female , POD # 1 S/p RALH, BSO, Cysto, SLN 22   - Doing well, vitals stable   - UOP adequate   - IVF: will Discontinue now   - Pain control: Didi/Tylenol/Motrin   - Labs: stable   - DVT Proph: Lovenox 40 mg qD   - Abx:Ancef preop   - Diet: General    - Encourage ambulation and use of incentive spirometer   - Path: pending   - Anticipate DC home today    Attending: Dr. Damion Sheets      Please page the resident named below with questions and concerns.     DO DEVAUGHN Slade Resident  12/15/2022, 8:18 AM

## 2022-12-15 NOTE — PROGRESS NOTES
Gyn Oncology Note    Patient seen and evaluated by me today  Physical exam normal post op  VSS, Afebrile.   UOP adequate  All questions are answered to her satisfaction today  She is given detailed dc instructions today  She will fu for post op next month  She understands she will need cancer surveillance visits every six months for five years  I will call her once I have the final path results in the days/weeks ahead  She will call or return to see me with any questions or troubles    LEE Andino MD

## 2022-12-15 NOTE — CARE COORDINATION
Case Management Assessment  Initial Evaluation    Date/Time of Evaluation: 12/15/2022 12:25 PM  Assessment Completed by: Germain Ramirez RN    If patient is discharged prior to next notation, then this note serves as note for discharge by case management. Patient Name: Mayi Herrera                   YOB: 1945  Diagnosis: Endometrial cancer [facility]) [C54.1]  Post-operative state [Z98.890]                   Date / Time: 12/14/2022  7:44 AM    Patient Admission Status: Observation   Readmission Risk (Low < 19, Mod (19-27), High > 27): No data recorded  Current PCP: Lucrecia Hayes MD  PCP verified by CM? (P) Yes (Dr. Vincent Bansal MD)    Chart Reviewed: Yes      History Provided by: (P) Patient  Patient Orientation: (P) Alert and Oriented, Person, Place, Situation    Patient Cognition: (P) Alert    Hospitalization in the last 30 days (Readmission):  No    If yes, Readmission Assessment in CM Navigator will be completed.     Advance Directives:      Code Status: Full Code   Patient's Primary Decision Maker is: (P) Legal Next of Kin      Discharge Planning:    Patient lives with: (P) Alone Type of Home: (P) House  Primary Care Giver: (P) Self  Patient Support Systems include: (P) Family Members, Children   Current Financial resources: (P) Medicare  Current community resources:    Current services prior to admission: (P) Durable Medical Equipment            Current DME: (P) Walker            Type of Home Care services:  (P) None    ADLS  Prior functional level: (P) Independent in ADLs/IADLs  Current functional level: (P) Independent in ADLs/IADLs    PT AM-PAC:   /24  OT AM-PAC:   /24    Family can provide assistance at DC: (P) Yes  Would you like Case Management to discuss the discharge plan with any other family members/significant others, and if so, who? (P) No  Plans to Return to Present Housing: (P) Yes  Other Identified Issues/Barriers to RETURNING to current housing: na  Potential Assistance needed at discharge: (P) N/A            Potential DME:    Patient expects to discharge to: (P) [facility] for transportation at discharge: (P) Family    Financial    Payor: Karen Pineda / Plan: Romero 1978 / Product Type: *No Product type* /     Does insurance require precert for SNF: na    Potential assistance Purchasing Medications: (P) No  Meds-to-Beds request:        [id number] - [place], [place] - [address]  [address]  Phone: [phone] Fax: [phone]      Notes:    Factors facilitating achievement of predicted outcomes: Family support, Motivated, Cooperative, and Pleasant    Barriers to discharge: Pain    Additional Case Management Notes: Spoke with patient at bedside, role explained. Plan to return home. Patient to have [employer] for supervision. Address, telephone numbers, PCP, ER contacts and insurance reviewed. Has transportation. The Plan for Transition of Care is related to the following treatment goals of Endometrial cancer ([facility] [address] [C54.1]  Post-operative state [N30.598]    IF APPLICABLE: The Patient and/or patient representative Tracy Lerner and her family were provided with a choice of provider and agrees with the discharge plan. Freedom of choice list with basic dialogue that supports the patient's individualized plan of care/goals and shares the quality data associated with the providers was provided to: (P) Patient   Patient Representative Name:       The Patient and/or Patient Representative Agree with the Discharge Plan?  (P) Yes    Hyacinth Johnson RN  Case Management Department  Ph: [phone]

## 2022-12-15 NOTE — PROGRESS NOTES
CLINICAL PHARMACY NOTE: MEDS TO BEDS    Total # of Prescriptions Filled: 5   The following medications were delivered to the patient:  Senexon-s 8.6- 50 mg  Ibuprofen 600 mg tab  Acetaminophen  mg tab  Gas relief 80 mg chew  Ondansetron odt 4 mg tab    Additional Documentation:

## 2022-12-15 NOTE — OP NOTE
[facility]                                OPERATIVE REPORT    PATIENT NAME: Danette Moseley                  :        1945  MED REC NO:   [id number]                             ROOM:       1054  ACCOUNT NO:   [de-identified]                           ADMIT DATE: 2022  PROVIDER:     Anderson Mclaughlin MD    DATE OF PROCEDURE:  2022    PREOPERATIVE DIAGNOSIS:  Endometrial adenocarcinoma. POSTOPERATIVE DIAGNOSIS:  Endometrial adenocarcinoma. OPERATION:  Total robotic hysterectomy, bilateral salpingo-oophorectomy,  bilateral pelvic sentinel lymph node biopsies. SURGEON:  Anderson Mclaughlin MD    ASSISTANT:  Angelic Brown, PGY-4, resident    COMPLICATIONS:  None. BLOOD LOSS:  Minimal.    OPERATIVE FINDINGS:  Normal-appearing uterus, cervix, tubes, and  ovaries. No evidence for extrauterine extension of cancer. DISPOSITION:  The patient to recovery room stable. DESCRIPTION:  Informed consent was obtained. The patient was brought to  the operating suite. She was carefully positioned, prepped and draped  in usual manner. Morel catheter was inserted into the bladder in a  sterile manner. Uterine manipulator was placed into the uterus around  the cervix. I made a 5 mm incision in the left upper quadrant. Camera  and trocar inserted under direct visualization. Intraperitoneal  placement confirmed. Abdomen insufflated with carbon dioxide gas. This  trocar site was upsized to 12 mm AirSeal.  The remaining four trocars  were placed under direct visualization in the usual manner for robotic  surgery. Robotics platform docked to the abdominal trocars. The  patient previously placed in steep Trendelenburg position. Instruments  inserted under direct visualization. The sidewalls were opened up  bilaterally. The ureters were identified.   The sentinel lymph nodes  were mapped using Firefly technology off the both pelvic sidewalls and  the area of the external iliac arteries and veins on both sides. [place] lymph nodes were removed and sent off for permanent  pathological analysis. Everything was hemostatic. Bipolar and  monopolar cautery were used for excellent hemostasis. The ureters,  bladder, colon, and bowel kept free from any harm or injury throughout  all phases of the dissection. The gonadal pedicles were isolated  bilaterally. These were clamped, cut, and divided using bipolar and  monopolar cautery. Broad ligaments were incised. Round ligaments were  incised and transected bilaterally. The bladder flap was taken down  below the level of the cervix. Uterine vessels were sealed and divided  on both sides using bipolar and monopolar cautery. Cardinal and  uterosacral ligaments were taken down on both sides using bipolar and  monopolar cautery. Anterior and posterior vaginotomy incisions were  made around the uterine manipulator cup. These were circumferentially  united around the uterine manipulator cup. Uterus, cervix, tubes,  ovaries were removed transvaginally. Everything in the pelvis was  hemostatic. The vaginal cuff was meticulously closed with V-Loc suture  with an excellent tight vaginal cuff closure achieved. The pelvis was  entirely hemostatic. Instruments were removed. The abdomen was  desufflated. Prior to this, the fascia of the left upper quadrant  incision was closed with Monocryl suture using a Herb-Cecelia device. Excellent tight fascial closure of the left upper quadrant incision was  achieved. That incision and the other four incisions were closed with  Monocryl suture and skin glue. I performed a cystoscopy at the end of  the operation, which confirmed that the bladder was normal with urine  seen briskly spilling from both ureteral orifices. The patient was  extubated, awakened, moved to the recovery room in stable condition.    Sponge, lap, needle, and instrument counts correct x2. Dr. Jax Subramanian  scrubbed and present for the entire procedure.         Dana Gomez MD    D: 12/14/2022 13:39:17       T: 12/14/2022 13:43:52     [email or web address]  Job#: [id number]     Doc#: [id number]    CC:

## 2022-12-16 LAB — SURGICAL PATHOLOGY REPORT: NORMAL

## 2022-12-19 ENCOUNTER — TELEPHONE (OUTPATIENT)
Dept: RADIATION ONCOLOGY | Age: 77
End: 2022-12-19

## 2022-12-19 DIAGNOSIS — C54.1 ENDOMETRIAL CANCER (HCC): Primary | ICD-10-CM

## 2022-12-19 NOTE — PROGRESS NOTES
Gyn Oncology Note    I talked to this lady at length about her pathology results.   I explained the findings, options and prognosis in detail  She will see Dr Anna Downey for an informational consultation regarding possible VBT  She will let me know via phone what she decides to do  She tells me that she will see Dr Karrie Sellers in [place] for post op vaginal exam later on in Jan 2023  She will return to see me for cancer surveillance visit in May or June 2023    Baron Bess MD

## 2022-12-19 NOTE — TELEPHONE ENCOUNTER
Referral in workQ from Dr. Damion Sheets for radiation consult. I called pt, pt answered but could not talk currently and stated she will c/b to schedule consult w/Dr. Jose Arechiga.

## 2022-12-23 ENCOUNTER — TELEPHONE (OUTPATIENT)
Dept: RADIATION ONCOLOGY | Age: 77
End: 2022-12-23

## 2022-12-27 ENCOUNTER — TELEPHONE (OUTPATIENT)
Dept: RADIATION ONCOLOGY | Age: 77
End: 2022-12-27

## 2022-12-27 NOTE — TELEPHONE ENCOUNTER
Patient called back states she not sure if she wants to see radiation oncology. Patient has a follow up with Dr. Epi Paez ob/gyn [place] on 1/23/23 states she would like to see what Dr. Epi Paez has to say referral deferred in the workqueue.

## 2023-01-13 PROBLEM — Z98.890 POST-OPERATIVE STATE: Status: RESOLVED | Noted: 2022-12-14 | Resolved: 2023-01-13

## 2023-01-23 ENCOUNTER — OFFICE VISIT (OUTPATIENT)
Dept: OBGYN | Age: 78
End: 2023-01-23

## 2023-01-23 VITALS
HEIGHT: 64 IN | WEIGHT: 175 LBS | SYSTOLIC BLOOD PRESSURE: 118 MMHG | DIASTOLIC BLOOD PRESSURE: 72 MMHG | BODY MASS INDEX: 29.88 KG/M2

## 2023-01-23 DIAGNOSIS — Z09 POSTOP CHECK: Primary | ICD-10-CM

## 2023-01-23 PROCEDURE — 1123F ACP DISCUSS/DSCN MKR DOCD: CPT | Performed by: OBSTETRICS & GYNECOLOGY

## 2023-01-23 PROCEDURE — 99024 POSTOP FOLLOW-UP VISIT: CPT | Performed by: OBSTETRICS & GYNECOLOGY

## 2023-01-23 PROCEDURE — 3078F DIAST BP <80 MM HG: CPT | Performed by: OBSTETRICS & GYNECOLOGY

## 2023-01-23 PROCEDURE — 3074F SYST BP LT 130 MM HG: CPT | Performed by: OBSTETRICS & GYNECOLOGY

## 2023-01-23 NOTE — PROGRESS NOTES
PROBLEM VISIT     Date of service: 2023    Johny Downey  Is a 65 y.o.  female    PT's PCP is: Pam Durham MD     : 1945                                             Subjective:       No LMP recorded. Patient has had a hysterectomy. 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  sober since then       Social History     Substance and Sexual Activity   Sexual Activity Not Currently       Allergies: Seasonal    Chief Complaint   Patient presents with    Post-Op Check     Pt had Total robotic hysterectomy, bilateral salpingo-oophorectomy,  bilateral pelvic sentinel lymph node biopsies on 22       Last Yearly:  22    Last pap: 22    Last HPV: never      NURSE: LMD  PE:  Vital Signs  Blood pressure 118/72, height 5' 4\" (1.626 m), weight 175 lb (79.4 kg), not currently breastfeeding. Labs:    No results found for this visit on 23. NURSE: Guilherme Pierson    HPI: The patient is here today for a postoperative check following robotic DUSTIN and BSO and lymph node biopsies for FIGO stage I endometrial carcinoma. She is doing well and her only complaint is that of fatigue    Yes  PT denies fever, chills, nausea and vomiting       Objective: Abdominal incisions are healing well. And inspection of the cuff revealed it is healing well also. Assessment and Plan: The patient is doing well postoperatively. She plans on following up with Dr. Sosa Tariq in June of this year          Diagnosis Orders   1. Postop check                  No follow-ups on file. FF: 15 minutes    There are no Patient Instructions on file for this visit. Over 50%of time spent on counseling and care coordination on: see assessment and plan,  She was also counseled on her preventative health maintenance recommendations and follow-up.       Robson Calloway MD,1/23/2023 1:26 PM

## 2023-01-26 ENCOUNTER — TELEPHONE (OUTPATIENT)
Dept: RADIATION ONCOLOGY | Age: 78
End: 2023-01-26

## 2023-01-26 NOTE — TELEPHONE ENCOUNTER
I contacted patient to see if she would like to schedule consult with rad onc. Last encounter patient wanted to wait until she saw Dr. Clayton Magallanes on 1/23 visit completed. Patient doesn't want to schedule at this time still. I informed patient I would let the referring office know. Referral closed and sent back to office at this time.

## 2023-01-27 ENCOUNTER — TELEPHONE (OUTPATIENT)
Dept: GYNECOLOGIC ONCOLOGY | Age: 78
End: 2023-01-27

## 2023-01-27 NOTE — TELEPHONE ENCOUNTER
Received message from [facility] office that pt declined to schedule until after she seen by  1/23/23 . She was called again after her appt with him and still declined to schedule. Referral was closed.

## 2023-01-27 NOTE — TELEPHONE ENCOUNTER
Called patient and scheduled follow up with Dr. Fernando Kramer 5/26/23. Pt was looking for Rad Onc closer to [place] due to transportation. Discussed with Federico IZAGUIRRE, we do not have anywhere closer. Pt voiced understanding and states she will call  office back and see if they could do a visit over the phone. Pt does not want writer to call and coordinate this for her.

## 2023-05-26 ENCOUNTER — OFFICE VISIT (OUTPATIENT)
Dept: GYNECOLOGIC ONCOLOGY | Age: 78
End: 2023-05-26

## 2023-05-26 VITALS
HEIGHT: 64 IN | BODY MASS INDEX: 28.44 KG/M2 | DIASTOLIC BLOOD PRESSURE: 79 MMHG | HEART RATE: 61 BPM | OXYGEN SATURATION: 99 % | SYSTOLIC BLOOD PRESSURE: 144 MMHG | WEIGHT: 166.6 LBS

## 2023-05-26 DIAGNOSIS — C54.1 ENDOMETRIAL CANCER (HCC): Primary | ICD-10-CM

## 2023-05-26 RX ORDER — ALPRAZOLAM 1 MG/1
TABLET ORAL PRN
COMMUNITY
Start: 2023-03-13

## 2023-05-26 ASSESSMENT — ENCOUNTER SYMPTOMS
EYES NEGATIVE: 1
GASTROINTESTINAL NEGATIVE: 1
BACK PAIN: 1
RESPIRATORY NEGATIVE: 1

## 2023-05-26 NOTE — PROGRESS NOTES
[address] [facility], [facility], [address] [facility] [address] present for her Endometrial cancer surveillance visit. CC/HPI: She has a history of Endometrioid Adenocarcinoma Grade 1 and has undergone RALH, BSO, SLN biopsy and cystoscopy on 12/14/22. She has declined follow up with the Radiation Oncologist.     Today, she has no complaints. She denies vaginal bleeding, itching, irration, pelvic pain or cramping, bloating, or discharge. She has no urinary or bowel complaints. Final pathology revealed Endometrioid Adenocarcinoma, FIGO grade 1. Invasion of 75% of endometrium. No Lymphovascular invasion      ROS:  I have personally reviewed and agree with the review of systems done by my ancillary staff in the [facility] documentation.       Past Medical History:   Diagnosis Date    Abnormal uterine bleeding (AUB) 11/2022    Arthritis     Endometrial adenocarcinoma ([facility] [address] 2022    Endometrial thickening on ultrasound 11/2022    GERD (gastroesophageal reflux disease)     Hyperlipidemia     Hypertension     NO MEDS    Impaired fasting glucose     Under care of service provider 12/13/2022    pcp-Hayder Grant-last visit oct 2022    Uses walker          Past Surgical History:   Procedure Laterality Date    ENDOMETRIAL BIOPSY  11/2022    Dr. Corinne Dietrich - endometrial adenocarcinoma grade 1    HYSTERECTOMY (CERVIX STATUS UNKNOWN) N/A 12/14/2022    XI ROBOTIC LAPAROSCOPIC TOTAL HYSTERECTOMY, BSO, PELVIC SENTINEL LYMPHNODE BIOPSY, POSSIBLE EXPLORATORY LAPAROTOMY OTHER NECESSARY PROCEDURES (ICG DYE) performed by Luzma Kurtz MD at [address] [place] [address]  12/14/2022    XI ROBOTIC LAPAROSCOPIC TOTAL HYSTERECTOMY, BSO, PELVIC SENTINEL LYMPHNODE BIOPSY, POSSIBLE EXPLORATORY LAPAROTOMY OTHER NECESSARY PROCEDURES (ICG DYE)    TOTAL HIP ARTHROPLASTY Left 12/12/2016    Dr Eduardo Nance         Family History   Problem Relation Age of Onset

## 2023-05-26 NOTE — PROGRESS NOTES
Review of Systems   Constitutional: Negative. HENT:  Negative. Eyes: Negative. Respiratory: Negative. Cardiovascular: Negative. Gastrointestinal: Negative. Endocrine: Negative. Genitourinary: Negative. Musculoskeletal:  Positive for back pain. Skin: Negative. Neurological: Negative.

## 2023-06-23 ENCOUNTER — HOSPITAL ENCOUNTER (OUTPATIENT)
Dept: NON INVASIVE DIAGNOSTICS | Age: 78
Discharge: HOME | End: 2023-06-23
Payer: MEDICARE

## 2023-06-23 DIAGNOSIS — R01.1 MURMUR: ICD-10-CM

## 2023-06-23 LAB
LV EF: 65 %
LVEF MODALITY: NORMAL

## 2023-06-23 PROCEDURE — 93306 TTE W/DOPPLER COMPLETE: CPT

## 2023-08-15 ENCOUNTER — OFFICE VISIT (OUTPATIENT)
Dept: CARDIOLOGY | Age: 78
End: 2023-08-15
Payer: MEDICARE

## 2023-08-15 VITALS
RESPIRATION RATE: 18 BRPM | OXYGEN SATURATION: 100 % | WEIGHT: 167 LBS | HEIGHT: 64 IN | HEART RATE: 56 BPM | SYSTOLIC BLOOD PRESSURE: 132 MMHG | BODY MASS INDEX: 28.51 KG/M2 | DIASTOLIC BLOOD PRESSURE: 75 MMHG

## 2023-08-15 DIAGNOSIS — R94.31 ABNORMAL EKG: ICD-10-CM

## 2023-08-15 DIAGNOSIS — I10 PRIMARY HYPERTENSION: Primary | Chronic | ICD-10-CM

## 2023-08-15 DIAGNOSIS — E78.2 MIXED HYPERLIPIDEMIA: Chronic | ICD-10-CM

## 2023-08-15 DIAGNOSIS — I38 VALVULAR HEART DISEASE: ICD-10-CM

## 2023-08-15 PROCEDURE — 99204 OFFICE O/P NEW MOD 45 MIN: CPT | Performed by: FAMILY MEDICINE

## 2023-08-15 PROCEDURE — 1123F ACP DISCUSS/DSCN MKR DOCD: CPT | Performed by: FAMILY MEDICINE

## 2023-08-15 PROCEDURE — 3078F DIAST BP <80 MM HG: CPT | Performed by: FAMILY MEDICINE

## 2023-08-15 PROCEDURE — 3075F SYST BP GE 130 - 139MM HG: CPT | Performed by: FAMILY MEDICINE

## 2023-08-15 RX ORDER — LOSARTAN POTASSIUM 25 MG/1
25 TABLET ORAL DAILY
Qty: 90 TABLET | Refills: 3 | Status: CANCELLED | OUTPATIENT
Start: 2023-08-15

## 2023-08-15 NOTE — PATIENT INSTRUCTIONS
SURVEY:    You may be receiving a survey from [employer] regarding your visit today. Please complete the survey to enable us to provide the highest quality of care to you and your family. If you cannot score us a very good on any question, please call the office to discuss how we could have made your experience a very good one. Thank you.

## 2023-08-15 NOTE — PROGRESS NOTES
Past Medical History:   Diagnosis Date    Abnormal uterine bleeding (AUB) 2022    Arthritis     Endometrial adenocarcinoma ([address])     Endometrial thickening on ultrasound 2022    GERD (gastroesophageal reflux disease)     Hyperlipidemia     Hypertension     NO MEDS    Impaired fasting glucose     Under care of service provider 2022    pcp-Hayder Grant-last visit oct 2022    Uses walker        CURRENT ALLERGIES: Seasonal REVIEW OF SYSTEMS: 14 systems were reviewed. Pertinent positives and negatives as above, all else negative. Past Surgical History:   Procedure Laterality Date    ENDOMETRIAL BIOPSY  2022    Dr. Torrey Gonsalves - endometrial adenocarcinoma grade 1    HYSTERECTOMY (CERVIX STATUS UNKNOWN) N/A 2022    XI ROBOTIC LAPAROSCOPIC TOTAL HYSTERECTOMY, BSO, PELVIC SENTINEL LYMPHNODE BIOPSY, POSSIBLE EXPLORATORY LAPAROTOMY OTHER NECESSARY PROCEDURES (ICG DYE) performed by Flores Monte [facility] at [facility]  2022    XI ROBOTIC LAPAROSCOPIC TOTAL HYSTERECTOMY, BSO, PELVIC SENTINEL LYMPHNODE BIOPSY, POSSIBLE EXPLORATORY LAPAROTOMY OTHER NECESSARY PROCEDURES (ICG DYE)    TOTAL HIP ARTHROPLASTY Left 2016    Dr Connie Moser    Social History:  Social History     Tobacco Use    Smoking status: Former     Packs/day: 1.00     Years: 2.00     Pack years: 2.00     Types: Cigarettes     Quit date:      Years since quittin.6    Smokeless tobacco: Never   Vaping Use    Vaping Use: Never used   Substance Use Topics    Alcohol use: Not Currently     Comment: former alcoholic - treatment center , last relapse in early s.  sober since then    Drug use: No        CURRENT MEDICATIONS:        Outpatient Medications Marked as Taking for the 8/15/23 encounter (Office Visit) with Hal Sandoval MD   Medication Sig Dispense Refill    ALPRAZolam (XANAX) 1 MG tablet as needed.       escitalopram (LEXAPRO) 5 MG tablet TAKE 1

## 2023-08-16 ENCOUNTER — TELEPHONE (OUTPATIENT)
Dept: CARDIOLOGY | Age: 78
End: 2023-08-16

## 2024-01-31 ENCOUNTER — OFFICE VISIT (OUTPATIENT)
Dept: OBGYN | Age: 79
End: 2024-01-31

## 2024-01-31 ENCOUNTER — HOSPITAL ENCOUNTER (OUTPATIENT)
Age: 79
Setting detail: SPECIMEN
Discharge: HOME | End: 2024-01-31
Payer: MEDICARE

## 2024-01-31 VITALS
BODY MASS INDEX: 28.34 KG/M2 | DIASTOLIC BLOOD PRESSURE: 74 MMHG | SYSTOLIC BLOOD PRESSURE: 134 MMHG | WEIGHT: 166 LBS | HEIGHT: 64 IN

## 2024-01-31 DIAGNOSIS — Z91.89 GYN EXAM FOR HIGH-RISK MEDICARE PATIENT: Primary | ICD-10-CM

## 2024-01-31 DIAGNOSIS — Z91.89 GYN EXAM FOR HIGH-RISK MEDICARE PATIENT: ICD-10-CM

## 2024-01-31 PROCEDURE — G0145 SCR C/V CYTO,THINLAYER,RESCR: HCPCS

## 2024-01-31 NOTE — PROGRESS NOTES
PROBLEM VISIT     Date of service: 2024    Tessa Ro  Is a 78 y.o.  female    PT's PCP is: Yuri Bright MD     : 1945                                             Subjective:       No LMP recorded. Patient has had a hysterectomy.   OB History    Para Term  AB Living   3         2   SAB IAB Ectopic Molar Multiple Live Births                    # Outcome Date GA Lbr Atul/2nd Weight Sex Delivery Anes PTL Lv   3       Vag-Spont      2       Vag-Spont      1       Vag-Spont           Social History     Tobacco Use   Smoking Status Former    Current packs/day: 0.00    Average packs/day: 1 pack/day for 2.0 years (2.0 ttl pk-yrs)    Types: Cigarettes    Start date:     Quit date:     Years since quittin.1   Smokeless Tobacco Never        Social History     Substance and Sexual Activity   Alcohol Use Not Currently    Comment: former alcoholic - treatment center , 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  sober since then       Allergies: Seasonal      Current Outpatient Medications:     ALPRAZolam (XANAX) 0.5 MG tablet, Take 1 tablet by mouth nightly as needed for Sleep or Anxiety for up to 60 days. Max Daily Amount: 0.5 mg, Disp: 30 tablet, Rfl: 1    escitalopram (LEXAPRO) 5 MG tablet, TAKE 1 TABLET BY MOUTH ONCE A DAY, Disp: 90 tablet, Rfl: 3    Multiple Vitamins-Minerals (ONE-A-DAY WOMENS 50+ ADVANTAGE) TABS, Take 1 tablet by mouth daily, Disp: , Rfl:     atorvastatin (LIPITOR) 20 MG tablet, TAKE 1 TABLET BY MOUTH ONCE A DAY, Disp: 90 tablet, Rfl: 3    aspirin 325 MG tablet, Take 1 tablet by mouth daily, Disp: , Rfl:     ALPRAZolam (XANAX) 0.5 MG tablet, Take 1 tablet by mouth at bedtime. Max Daily Amount: 0.5 mg (Patient not taking: Reported on 2024), Disp: , Rfl:     Social History     Substance and Sexual Activity   Sexual Activity Not Currently    Partners: Male       Last Yearly:  22    Last pap: 22(-)    Last HPV:

## 2024-03-02 LAB — CYTOLOGY REPORT: NORMAL

## 2024-06-19 ENCOUNTER — OFFICE VISIT (OUTPATIENT)
Dept: CARDIOLOGY | Age: 79
End: 2024-06-19
Payer: MEDICARE

## 2024-06-19 VITALS
HEART RATE: 64 BPM | DIASTOLIC BLOOD PRESSURE: 66 MMHG | OXYGEN SATURATION: 96 % | BODY MASS INDEX: 28.27 KG/M2 | HEIGHT: 64 IN | WEIGHT: 165.6 LBS | SYSTOLIC BLOOD PRESSURE: 138 MMHG | RESPIRATION RATE: 18 BRPM

## 2024-06-19 DIAGNOSIS — I38 VALVULAR HEART DISEASE: ICD-10-CM

## 2024-06-19 DIAGNOSIS — E78.2 MIXED HYPERLIPIDEMIA: ICD-10-CM

## 2024-06-19 DIAGNOSIS — R94.31 ABNORMAL EKG: ICD-10-CM

## 2024-06-19 DIAGNOSIS — I10 PRIMARY HYPERTENSION: Primary | ICD-10-CM

## 2024-06-19 PROCEDURE — 3078F DIAST BP <80 MM HG: CPT | Performed by: FAMILY MEDICINE

## 2024-06-19 PROCEDURE — 3075F SYST BP GE 130 - 139MM HG: CPT | Performed by: FAMILY MEDICINE

## 2024-06-19 PROCEDURE — 99214 OFFICE O/P EST MOD 30 MIN: CPT | Performed by: FAMILY MEDICINE

## 2024-06-19 PROCEDURE — 1123F ACP DISCUSS/DSCN MKR DOCD: CPT | Performed by: FAMILY MEDICINE

## 2024-06-19 PROCEDURE — 93000 ELECTROCARDIOGRAM COMPLETE: CPT | Performed by: FAMILY MEDICINE

## 2024-06-19 RX ORDER — LOSARTAN POTASSIUM 25 MG/1
25 TABLET ORAL DAILY
Qty: 90 TABLET | Refills: 3 | Status: SHIPPED | OUTPATIENT
Start: 2024-06-19

## 2024-06-19 RX ORDER — ALPRAZOLAM 0.5 MG/1
0.5 TABLET ORAL NIGHTLY PRN
COMMUNITY

## 2024-06-19 NOTE — PROGRESS NOTES
nightly as needed for Sleep. Max Daily Amount: 0.5 mg      escitalopram (LEXAPRO) 5 MG tablet Take 1 tablet by mouth daily 90 tablet 3    atorvastatin (LIPITOR) 20 MG tablet Take 1 tablet by mouth daily 90 tablet 3    Multiple Vitamins-Minerals (ONE-A-DAY WOMENS 50+ ADVANTAGE) TABS Take 1 tablet by mouth daily      aspirin 325 MG tablet Take 1 tablet by mouth daily       FAMILY HISTORY: family history includes Alcohol Abuse in her father; Cancer in her mother; High Blood Pressure in her mother; Prostate Cancer in her brother.     Physical Examination:      BP (!) 163/78 (Site: Left Upper Arm, Position: Sitting, Cuff Size: Medium Adult)   Pulse 69   Resp 18   Ht 1.626 m (5' 4\")   Wt 75.1 kg (165 lb 9.6 oz)   SpO2 96%   BMI 28.43 kg/m²  Body mass index is 28.43 kg/m².    Constitutional: She is oriented to person. She appears well-developed and well-nourished. In no acute distress.   HEENT: Normocephalic and atraumatic.No JVD present. Carotid bruit is not present. No mass and no thyromegaly present. No lymphadenopathy present.  Cardiovascular: Normal rate, regular rhythm, normal heart sounds. Exam reveals no gallop and no friction rubs. 3/6 systolic murmur, 2nd intercostal space on the RIGHT just lateral to the sternum.   Pulmonary/Chest: Effort normal and breath sounds normal. No respiratory distress. She has no wheezes, rhonchi or rales. Abdominal: Soft, non-tender. Bowel sounds and aorta are normal. She exhibits no organomegaly, mass or bruit.   Extremities: Trace. No cyanosis or clubbing. 2+ radial and carotid pulses. Distal extremity pulses: 2+ bilaterally..  Neurological: She is alert and oriented to person. No evidence of gross cranial nerve deficit. Coordination appeared normal.   Skin: Skin is warm and dry. There is no rash or diaphoresis.   Psychiatric: She has a normal mood and affect. Her speech is normal and behavior is normal.      MOST RECENT LABS ON RECORD:   Lab Results   Component Value Date

## 2024-06-20 LAB
ANION GAP SERPL CALCULATED.3IONS-SCNC: 12 MEQ/L (ref 7–16)
BUN BLDV-MCNC: 14 MG/DL (ref 8–23)
CALCIUM SERPL-MCNC: 9.6 MG/DL (ref 8.5–10.5)
CHLORIDE BLD-SCNC: 105 MEQ/L (ref 95–107)
CO2: 24 MEQ/L (ref 19–31)
CREAT SERPL-MCNC: 0.87 MG/DL (ref 0.6–1.3)
EGFR IF NONAFRICAN AMERICAN: 67 ML/MIN/1.73
GLUCOSE: 91 MG/DL (ref 70–99)
POTASSIUM SERPL-SCNC: 4.4 MEQ/L (ref 3.5–5.4)
SODIUM BLD-SCNC: 141 MEQ/L (ref 133–146)

## 2024-07-12 ENCOUNTER — OFFICE VISIT (OUTPATIENT)
Dept: GYNECOLOGIC ONCOLOGY | Age: 79
End: 2024-07-12
Payer: MEDICARE

## 2024-07-12 VITALS
HEART RATE: 56 BPM | DIASTOLIC BLOOD PRESSURE: 69 MMHG | OXYGEN SATURATION: 99 % | WEIGHT: 165 LBS | SYSTOLIC BLOOD PRESSURE: 140 MMHG | HEIGHT: 64 IN | BODY MASS INDEX: 28.17 KG/M2

## 2024-07-12 DIAGNOSIS — C54.1 ENDOMETRIAL CANCER (HCC): Primary | ICD-10-CM

## 2024-07-12 PROCEDURE — 3077F SYST BP >= 140 MM HG: CPT | Performed by: OBSTETRICS & GYNECOLOGY

## 2024-07-12 PROCEDURE — 3078F DIAST BP <80 MM HG: CPT | Performed by: OBSTETRICS & GYNECOLOGY

## 2024-07-12 PROCEDURE — 1123F ACP DISCUSS/DSCN MKR DOCD: CPT | Performed by: OBSTETRICS & GYNECOLOGY

## 2024-07-12 PROCEDURE — 99214 OFFICE O/P EST MOD 30 MIN: CPT | Performed by: OBSTETRICS & GYNECOLOGY

## 2024-07-12 NOTE — PROGRESS NOTES
Review of Systems   Constitutional: Negative.    HENT:  Negative.     Eyes: Negative.    Respiratory: Negative.     Cardiovascular: Negative.    Gastrointestinal: Negative.    Endocrine: Negative.    Genitourinary: Negative.     Musculoskeletal:  Positive for back pain (lower back).   Skin: Negative.    Neurological: Negative.    Hematological:  Bruises/bleeds easily.

## 2025-02-03 ENCOUNTER — HOSPITAL ENCOUNTER (OUTPATIENT)
Age: 80
Setting detail: SPECIMEN
Discharge: HOME | End: 2025-02-03
Payer: MEDICARE

## 2025-02-03 ENCOUNTER — OFFICE VISIT (OUTPATIENT)
Dept: OBGYN | Age: 80
End: 2025-02-03
Payer: MEDICARE

## 2025-02-03 VITALS
BODY MASS INDEX: 30.12 KG/M2 | HEIGHT: 63 IN | DIASTOLIC BLOOD PRESSURE: 80 MMHG | SYSTOLIC BLOOD PRESSURE: 152 MMHG | WEIGHT: 170 LBS

## 2025-02-03 DIAGNOSIS — Z91.89 GYN EXAM FOR HIGH-RISK MEDICARE PATIENT: ICD-10-CM

## 2025-02-03 DIAGNOSIS — Z12.31 VISIT FOR SCREENING MAMMOGRAM: ICD-10-CM

## 2025-02-03 DIAGNOSIS — Z91.89 GYN EXAM FOR HIGH-RISK MEDICARE PATIENT: Primary | ICD-10-CM

## 2025-02-03 PROCEDURE — 1159F MED LIST DOCD IN RCRD: CPT | Performed by: OBSTETRICS & GYNECOLOGY

## 2025-02-03 PROCEDURE — 3079F DIAST BP 80-89 MM HG: CPT | Performed by: OBSTETRICS & GYNECOLOGY

## 2025-02-03 PROCEDURE — G0101 CA SCREEN;PELVIC/BREAST EXAM: HCPCS | Performed by: OBSTETRICS & GYNECOLOGY

## 2025-02-03 PROCEDURE — G0145 SCR C/V CYTO,THINLAYER,RESCR: HCPCS

## 2025-02-03 PROCEDURE — 3077F SYST BP >= 140 MM HG: CPT | Performed by: OBSTETRICS & GYNECOLOGY

## 2025-02-03 NOTE — PROGRESS NOTES
MEDICARE PHYSICAL    Date of service: 2/3/2025    Tessa Ro  Is a 79 y.o. , female    PT's PCP is: Yuri Bright MD     : 1945     HIGH RISK ASSESSMENT    Maternal BRITTANY Exposure (in utero): No    Sexual activity < 16 yrs of age: No    Greater than 5 sexual partners: Yes    3 abnormal paps in the last 7 years: No    History of any STD (including HIV): No                                        Subjective:       No LMP recorded. Patient has had a hysterectomy.     Are your menses regular: not applicable    OB History    Para Term  AB Living   3         2   SAB IAB Ectopic Molar Multiple Live Births                    # Outcome Date GA Lbr Atul/2nd Weight Sex Type Anes PTL Lv   3       Vag-Spont      2       Vag-Spont      1       Vag-Spont           Social History     Tobacco Use   Smoking Status Former    Current packs/day: 0.00    Average packs/day: 1 pack/day for 2.0 years (2.0 ttl pk-yrs)    Types: Cigarettes    Start date:     Quit date:     Years since quittin.1   Smokeless Tobacco Never        Social History     Substance and Sexual Activity   Alcohol Use Not Currently    Comment: former alcoholic - treatment center , 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 Max Daily Amount: 0.5 mg, Disp: 30 tablet, Rfl: 5    losartan (COZAAR) 25 MG tablet, Take 1 tablet by mouth daily, Disp: 90 tablet, Rfl: 3    escitalopram (LEXAPRO) 5 MG tablet, Take 1 tablet by mouth daily, Disp: 90 tablet, Rfl: 3    atorvastatin (LIPITOR) 20 MG tablet, Take 1 tablet by mouth daily, Disp: 90 tablet, Rfl: 3    Multiple Vitamins-Minerals (ONE-A-DAY WOMENS 50+ ADVANTAGE) TABS, Take 1 tablet by mouth daily, Disp: , Rfl:     aspirin 325 MG tablet, Take 1 tablet by mouth daily, Disp: ,

## 2025-02-18 LAB — CYTOLOGY REPORT: NORMAL

## 2025-04-22 ENCOUNTER — HOSPITAL ENCOUNTER (OUTPATIENT)
Dept: WOMENS IMAGING | Age: 80
Discharge: HOME | End: 2025-04-24
Attending: OBSTETRICS & GYNECOLOGY
Payer: MEDICARE

## 2025-04-22 VITALS — HEIGHT: 64 IN | BODY MASS INDEX: 28.17 KG/M2 | WEIGHT: 165 LBS

## 2025-04-22 DIAGNOSIS — Z12.31 VISIT FOR SCREENING MAMMOGRAM: ICD-10-CM

## 2025-04-22 PROCEDURE — 77067 SCR MAMMO BI INCL CAD: CPT

## 2025-04-24 ENCOUNTER — RESULTS FOLLOW-UP (OUTPATIENT)
Dept: OBGYN | Age: 80
End: 2025-04-24

## 2025-05-21 NOTE — TELEPHONE ENCOUNTER
Bananas  [place]  [place]  [place]  X 5 days   Patient states you had mentioned possibly starting her on a medication and she wanted to know the name of it. She said you never stated the name but it was either 25 or 50 mg.  Can you please advise

## 2025-06-11 DIAGNOSIS — I10 PRIMARY HYPERTENSION: ICD-10-CM

## 2025-06-11 DIAGNOSIS — E78.2 MIXED HYPERLIPIDEMIA: ICD-10-CM

## 2025-06-11 DIAGNOSIS — R94.31 ABNORMAL EKG: ICD-10-CM

## 2025-06-11 DIAGNOSIS — I38 VALVULAR HEART DISEASE: ICD-10-CM

## 2025-06-11 RX ORDER — LOSARTAN POTASSIUM 25 MG/1
25 TABLET ORAL DAILY
Qty: 90 TABLET | Refills: 3 | OUTPATIENT
Start: 2025-06-11

## 2025-06-12 DIAGNOSIS — I10 PRIMARY HYPERTENSION: ICD-10-CM

## 2025-06-12 DIAGNOSIS — E78.2 MIXED HYPERLIPIDEMIA: ICD-10-CM

## 2025-06-12 DIAGNOSIS — R94.31 ABNORMAL EKG: ICD-10-CM

## 2025-06-12 DIAGNOSIS — I38 VALVULAR HEART DISEASE: ICD-10-CM

## 2025-06-12 RX ORDER — LOSARTAN POTASSIUM 25 MG/1
25 TABLET ORAL DAILY
Qty: 90 TABLET | Refills: 3 | Status: SHIPPED | OUTPATIENT
Start: 2025-06-12

## 2025-07-11 ENCOUNTER — OFFICE VISIT (OUTPATIENT)
Dept: GYNECOLOGIC ONCOLOGY | Age: 80
End: 2025-07-11
Payer: MEDICARE

## 2025-07-11 VITALS
TEMPERATURE: 97.3 F | SYSTOLIC BLOOD PRESSURE: 140 MMHG | OXYGEN SATURATION: 95 % | DIASTOLIC BLOOD PRESSURE: 68 MMHG | BODY MASS INDEX: 28.36 KG/M2 | HEART RATE: 75 BPM | WEIGHT: 165.2 LBS

## 2025-07-11 DIAGNOSIS — C54.1 ENDOMETRIAL CANCER (HCC): Primary | ICD-10-CM

## 2025-07-11 PROCEDURE — 3078F DIAST BP <80 MM HG: CPT | Performed by: OBSTETRICS & GYNECOLOGY

## 2025-07-11 PROCEDURE — 1126F AMNT PAIN NOTED NONE PRSNT: CPT | Performed by: OBSTETRICS & GYNECOLOGY

## 2025-07-11 PROCEDURE — 1159F MED LIST DOCD IN RCRD: CPT | Performed by: OBSTETRICS & GYNECOLOGY

## 2025-07-11 PROCEDURE — 99213 OFFICE O/P EST LOW 20 MIN: CPT | Performed by: OBSTETRICS & GYNECOLOGY

## 2025-07-11 PROCEDURE — 1123F ACP DISCUSS/DSCN MKR DOCD: CPT | Performed by: OBSTETRICS & GYNECOLOGY

## 2025-07-11 PROCEDURE — 3077F SYST BP >= 140 MM HG: CPT | Performed by: OBSTETRICS & GYNECOLOGY

## 2025-07-11 NOTE — PROGRESS NOTES
Review of Systems   Constitutional:  Negative for appetite change, chills, diaphoresis, fatigue, fever and unexpected weight change.   HENT:   Negative for hearing loss, lump/mass, mouth sores, nosebleeds, sore throat, tinnitus, trouble swallowing and voice change.    Eyes:  Negative for eye problems and icterus.   Respiratory:  Negative for chest tightness, cough, hemoptysis, shortness of breath and wheezing.    Cardiovascular:  Negative for chest pain, leg swelling and palpitations.   Gastrointestinal:  Negative for abdominal distention, abdominal pain, blood in stool, constipation, diarrhea, nausea, rectal pain and vomiting.   Endocrine: Negative for hot flashes.   Genitourinary:  Negative for bladder incontinence, difficulty urinating, dyspareunia, dysuria, frequency, hematuria, menstrual problem, nocturia, pelvic pain, vaginal bleeding and vaginal discharge.    Musculoskeletal:  Positive for back pain and gait problem (Walks with walker). Negative for arthralgias, flank pain, myalgias, neck pain and neck stiffness.   Skin:  Negative for itching, rash and wound.   Neurological:  Positive for gait problem (Walks with walker). Negative for dizziness, extremity weakness, headaches, light-headedness, numbness, seizures and speech difficulty.   Hematological:  Negative for adenopathy. Does not bruise/bleed easily.   Psychiatric/Behavioral:  Negative for confusion, decreased concentration, depression, sleep disturbance and suicidal ideas. The patient is not nervous/anxious.

## 2025-07-14 NOTE — PROGRESS NOTES
Tessa Ro is a 80 y.o. female that presents today for:  Chief Complaint   Patient presents with    Follow-up     Surveillance: Grade 1 Endometrioid Adenocarcinoma         HPI:    80 y.o.  woman, seen in follow up today for endometrial cancer surveillance  No complaints  She saw Dr Rivera six months ago  Normal exam  No bleeding, no pain  I spent 20 minutes in office today managing this case today in the office  I revd her notes, path, records, history in detail today        ROS:  I have personally reviewed and agree with the review of systems done by my ancillary staff in the EPIC documentation.        Past Medical History:   Diagnosis Date    Abnormal uterine bleeding (AUB) 11/2022    Arthritis     Endometrial adenocarcinoma (HCC) 2022    Endometrial thickening on ultrasound 11/2022    GERD (gastroesophageal reflux disease)     Hyperlipidemia     Hypertension     NO MEDS    Impaired fasting glucose     Under care of service provider 12/13/2022    pcp-Hayder Grant-last visit oct 2022    Uses walker        Past Surgical History:   Procedure Laterality Date    ENDOMETRIAL BIOPSY  11/2022    Dr. Rivera - endometrial adenocarcinoma grade 1    HYSTERECTOMY (CERVIX STATUS UNKNOWN) N/A 12/14/2022    XI ROBOTIC LAPAROSCOPIC TOTAL HYSTERECTOMY, BSO, PELVIC SENTINEL LYMPHNODE BIOPSY, POSSIBLE EXPLORATORY LAPAROTOMY OTHER NECESSARY PROCEDURES (ICG DYE) performed by Yuri CALZADA MD at [facility] OR    LAPAROSCOPIC HYSTERECTOMY  12/14/2022    XI ROBOTIC LAPAROSCOPIC TOTAL HYSTERECTOMY, BSO, PELVIC SENTINEL LYMPHNODE BIOPSY, POSSIBLE EXPLORATORY LAPAROTOMY OTHER NECESSARY PROCEDURES (ICG DYE)    TOTAL HIP ARTHROPLASTY Left 12/12/2016    Dr Mack    TUBAL LIGATION  1981       Family History   Problem Relation Age of Onset    Cancer Mother         uncertain type    High Blood Pressure Mother     Alcohol Abuse Father     Prostate Cancer Brother        Social History:   No history of drug, alcohol, or

## (undated) DEVICE — TIP COVER ACCESSORY

## (undated) DEVICE — AIRSEAL 12 MM ACCESS PORT AND PALM GRIP OBTURATOR WITH BLADELESS OPTICAL TIP, 120 MM LENGTH: Brand: AIRSEAL

## (undated) DEVICE — COVER OR TBL W40XL90IN ABSRB STD AND GRIPPY BK SAHARA

## (undated) DEVICE — ELECTRO LUBE IS A SINGLE PATIENT USE DEVICE THAT IS INTENDED TO BE USED ON ELECTROSURGICAL ELECTRODES TO REDUCE STICKING.: Brand: KEY SURGICAL ELECTRO LUBE

## (undated) DEVICE — ADHESIVE SKIN CLOSURE TOP 36 CC HI VISC DERMBND MINI

## (undated) DEVICE — TROCAR: Brand: KII FIOS FIRST ENTRY

## (undated) DEVICE — GLOVE ORANGE PI 8   MSG9080

## (undated) DEVICE — COVER,LIGHT HANDLE,FLX,2/PK: Brand: MEDLINE INDUSTRIES, INC.

## (undated) DEVICE — SUTURE DEV SZ 2-0 WND CLSR ABSRB GS-22 VLOC COVIDIEN VLOCM2145

## (undated) DEVICE — GLOVE ORANGE PI 7   MSG9070

## (undated) DEVICE — Device: Brand: UTERINE ELEVATOR PRO

## (undated) DEVICE — ELECTRODE PT RET AD L9FT HI MOIST COND ADH HYDRGEL CORDED

## (undated) DEVICE — GLOVE SURG SZ 65 THK91MIL LTX FREE SYN POLYISOPRENE

## (undated) DEVICE — GLOVE SURG SZ 7 L12IN THK7.5MIL DK GRN LTX FREE MSG6570] MEDLINE INDUSTRIES INC]

## (undated) DEVICE — TOTAL TRAY, 16FR 10ML SIL FOLEY, URN: Brand: MEDLINE

## (undated) DEVICE — PAD PT POS 36 IN SURGYPAD DISP

## (undated) DEVICE — TRI-LUMEN FILTERED TUBE SET WITH ACTIVATED CHARCOAL FILTER: Brand: AIRSEAL

## (undated) DEVICE — CANNULA SEAL

## (undated) DEVICE — CONTAINER,SPECIMEN,4OZ,OR STRL: Brand: MEDLINE

## (undated) DEVICE — CYSTO/BLADDER IRRIGATION SET, REGULATING CLAMP

## (undated) DEVICE — DRAPE,UNDRBUT,WHT GRAD PCH,CAPPORT,20/CS: Brand: MEDLINE

## (undated) DEVICE — UNDERPANTS MAT L XL SEAMLESS CLR CODE WAISTBAND KNIT

## (undated) DEVICE — DEVICE TRCR 12X9X3IN WHT CLSR DISP OMNICLOSE

## (undated) DEVICE — GLOVE SURG SZ 6 THK91MIL LTX FREE SYN POLYISOPRENE ANTI

## (undated) DEVICE — ARM DRAPE

## (undated) DEVICE — PAD,SANITARY,11 IN,MAXI,W/WINGS,N-STRL: Brand: MEDLINE

## (undated) DEVICE — APPLICATOR MEDICATED 26 CC SOLUTION HI LT ORNG CHLORAPREP

## (undated) DEVICE — SVMMC GYN ROBOTIC PK

## (undated) DEVICE — SUTURE MCRYL SZ 4-0 L18IN ABSRB UD P-3 L13MM 3/8 CIR PRIM Y494G

## (undated) DEVICE — TOWEL,OR,DSP,ST,NATURAL,DLX,4/PK,20PK/CS: Brand: MEDLINE

## (undated) DEVICE — SYRINGE MED 5ML STD CLR PLAS LUERLOCK TIP N CTRL DISP

## (undated) DEVICE — COUNTER NDL 10 COUNT HLD 20 FOAM BLK SGL MAG

## (undated) DEVICE — SOLUTION SCRB 4OZ 4% CHG H2O AIDED FOR PREOPERATIVE SKIN

## (undated) DEVICE — GARMENT,MEDLINE,DVT,INT,CALF,MED, GEN2: Brand: MEDLINE

## (undated) DEVICE — DRAPE,REIN 53X77,STERILE: Brand: MEDLINE

## (undated) DEVICE — GOWN,AURORA,NONREINFORCED,LARGE: Brand: MEDLINE

## (undated) DEVICE — 3M™ IOBAN™ 2 ANTIMICROBIAL INCISE DRAPE 6650EZ: Brand: IOBAN™ 2

## (undated) DEVICE — GOWN,SIRUS,NONRNF,SETINSLV,XL,20/CS: Brand: MEDLINE

## (undated) DEVICE — SOLUTION ANTIFOG VIS SYS CLEARIFY LAPSCP

## (undated) DEVICE — NEEDLE SPINAL 22GA L3.5IN SPINOCAN